# Patient Record
Sex: MALE | URBAN - METROPOLITAN AREA
[De-identification: names, ages, dates, MRNs, and addresses within clinical notes are randomized per-mention and may not be internally consistent; named-entity substitution may affect disease eponyms.]

---

## 2021-03-15 DIAGNOSIS — Z23 NEED FOR VACCINATION: ICD-10-CM

## 2022-08-10 ENCOUNTER — APPOINTMENT (RX ONLY)
Dept: URBAN - METROPOLITAN AREA CLINIC 4 | Facility: CLINIC | Age: 62
Setting detail: DERMATOLOGY
End: 2022-08-10

## 2022-08-10 DIAGNOSIS — Z71.89 OTHER SPECIFIED COUNSELING: ICD-10-CM

## 2022-08-10 DIAGNOSIS — L57.0 ACTINIC KERATOSIS: ICD-10-CM

## 2022-08-10 PROBLEM — D48.5 NEOPLASM OF UNCERTAIN BEHAVIOR OF SKIN: Status: ACTIVE | Noted: 2022-08-10

## 2022-08-10 PROCEDURE — ? COUNSELING

## 2022-08-10 PROCEDURE — 99203 OFFICE O/P NEW LOW 30 MIN: CPT

## 2022-08-10 PROCEDURE — ? ADDITIONAL NOTES

## 2022-08-10 PROCEDURE — ? PHOTO-DOCUMENTATION

## 2022-08-10 ASSESSMENT — LOCATION ZONE DERM
LOCATION ZONE: FACE
LOCATION ZONE: HAND

## 2022-08-10 ASSESSMENT — LOCATION DETAILED DESCRIPTION DERM
LOCATION DETAILED: RIGHT INFERIOR FOREHEAD
LOCATION DETAILED: LEFT RADIAL DORSAL HAND
LOCATION DETAILED: RIGHT ULNAR DORSAL HAND

## 2022-08-10 ASSESSMENT — LOCATION SIMPLE DESCRIPTION DERM
LOCATION SIMPLE: RIGHT FOREHEAD
LOCATION SIMPLE: RIGHT HAND
LOCATION SIMPLE: LEFT HAND

## 2022-08-10 NOTE — PROCEDURE: ADDITIONAL NOTES
Detail Level: Simple
Additional Notes: Discussed evaluation/tx options including monitoring, surgical removal, biopsy, and US. We discussed that due to the long standing nature with lack of progression and clinical appearance mitigate against malignancy. Patient wished to observe for now and we will re-evaluate the area in 3 months for progression. He will contact me sooner if progression prior.
Render Risk Assessment In Note?: no
Additional Notes: Trial of sun protection though if no resolve at f/u then treatment either with cryo or Efudex.

## 2023-01-09 ENCOUNTER — APPOINTMENT (RX ONLY)
Dept: URBAN - METROPOLITAN AREA CLINIC 4 | Facility: CLINIC | Age: 63
Setting detail: DERMATOLOGY
End: 2023-01-09

## 2023-01-09 DIAGNOSIS — L57.0 ACTINIC KERATOSIS: ICD-10-CM

## 2023-01-09 DIAGNOSIS — Z71.89 OTHER SPECIFIED COUNSELING: ICD-10-CM

## 2023-01-09 PROBLEM — D48.5 NEOPLASM OF UNCERTAIN BEHAVIOR OF SKIN: Status: ACTIVE | Noted: 2023-01-09

## 2023-01-09 PROCEDURE — ? ADDITIONAL NOTES

## 2023-01-09 PROCEDURE — 17000 DESTRUCT PREMALG LESION: CPT

## 2023-01-09 PROCEDURE — ? COUNSELING

## 2023-01-09 PROCEDURE — ? LIQUID NITROGEN

## 2023-01-09 PROCEDURE — 17003 DESTRUCT PREMALG LES 2-14: CPT

## 2023-01-09 PROCEDURE — 99212 OFFICE O/P EST SF 10 MIN: CPT | Mod: 25

## 2023-01-09 ASSESSMENT — LOCATION DETAILED DESCRIPTION DERM
LOCATION DETAILED: RIGHT ULNAR DORSAL HAND
LOCATION DETAILED: LEFT SUPERIOR FOREHEAD
LOCATION DETAILED: RIGHT CENTRAL ZYGOMA
LOCATION DETAILED: LEFT RADIAL DORSAL HAND
LOCATION DETAILED: RIGHT CENTRAL MALAR CHEEK
LOCATION DETAILED: LEFT SUPERIOR LATERAL MALAR CHEEK
LOCATION DETAILED: RIGHT INFERIOR FOREHEAD

## 2023-01-09 ASSESSMENT — LOCATION SIMPLE DESCRIPTION DERM
LOCATION SIMPLE: RIGHT ZYGOMA
LOCATION SIMPLE: LEFT HAND
LOCATION SIMPLE: RIGHT FOREHEAD
LOCATION SIMPLE: LEFT CHEEK
LOCATION SIMPLE: LEFT FOREHEAD
LOCATION SIMPLE: RIGHT CHEEK
LOCATION SIMPLE: RIGHT HAND

## 2023-01-09 ASSESSMENT — LOCATION ZONE DERM
LOCATION ZONE: HAND
LOCATION ZONE: FACE

## 2023-01-09 NOTE — PROCEDURE: ADDITIONAL NOTES
Additional Notes: We have discussed that I am uncertain of the nature on the ear and we have discussed the potential for malignancy though the long standing, unchanged, nature mitigates against this. Offered bx vs US vs complete removal and he wishes to observe.
Detail Level: Simple
Render Risk Assessment In Note?: no

## 2023-01-09 NOTE — PROCEDURE: LIQUID NITROGEN
Consent: The patient's consent was obtained including but not limited to risks of crusting, scabbing, blistering, scarring, darker or lighter pigmentary change, recurrence, incomplete removal and infection.
Application Tool (Optional): Cry-AC
Render Note In Bullet Format When Appropriate: No
Number Of Freeze-Thaw Cycles: 1 freeze-thaw cycle
Duration Of Freeze Thaw-Cycle (Seconds): 3
Post-Care Instructions: I reviewed with the patient in detail post-care instructions. Patient is to wear sunprotection, and avoid picking at any of the treated lesions. Pt may apply Vaseline to crusted or scabbing areas.
Show Applicator Variable?: Yes
Detail Level: Detailed
Aperture Size (Optional): C

## 2023-08-23 ENCOUNTER — HOSPITAL ENCOUNTER (OUTPATIENT)
Dept: RADIOLOGY | Facility: MEDICAL CENTER | Age: 63
End: 2023-08-23
Payer: COMMERCIAL

## 2023-09-08 ENCOUNTER — APPOINTMENT (OUTPATIENT)
Dept: ADMISSIONS | Facility: MEDICAL CENTER | Age: 63
DRG: 328 | End: 2023-09-08
Attending: SURGERY
Payer: COMMERCIAL

## 2023-09-15 ENCOUNTER — PRE-ADMISSION TESTING (OUTPATIENT)
Dept: ADMISSIONS | Facility: MEDICAL CENTER | Age: 63
DRG: 328 | End: 2023-09-15
Attending: SURGERY
Payer: COMMERCIAL

## 2023-09-15 RX ORDER — ATORVASTATIN CALCIUM 40 MG/1
40 TABLET, FILM COATED ORAL DAILY
COMMUNITY
Start: 2023-09-09

## 2023-09-15 RX ORDER — LORAZEPAM 1 MG/1
1 TABLET ORAL 2 TIMES DAILY PRN
COMMUNITY
Start: 2023-08-04

## 2023-09-15 RX ORDER — PANTOPRAZOLE SODIUM 40 MG/1
40 TABLET, DELAYED RELEASE ORAL 2 TIMES DAILY
Status: ON HOLD | COMMUNITY
Start: 2023-07-20 | End: 2023-10-13

## 2023-09-15 NOTE — OR NURSING
RN pre admit tele appointment complete.  Surgical clearance strongly recommended per [facility] Pre-Procedural Clearance Protocol.  Clearance forms faxed to Dr. Ganser 9/15/23 at 07:51.  Fax confirmation received.

## 2023-09-19 ENCOUNTER — PRE-ADMISSION TESTING (OUTPATIENT)
Dept: ADMISSIONS | Facility: MEDICAL CENTER | Age: 63
DRG: 328 | End: 2023-09-19
Attending: SURGERY
Payer: COMMERCIAL

## 2023-09-19 DIAGNOSIS — Z01.810 PRE-OPERATIVE CARDIOVASCULAR EXAMINATION: ICD-10-CM

## 2023-09-19 DIAGNOSIS — Z01.812 PRE-OPERATIVE LABORATORY EXAMINATION: ICD-10-CM

## 2023-09-19 LAB
ABO GROUP BLD: NORMAL
ANION GAP SERPL CALC-SCNC: 10 MMOL/L (ref 7–16)
BLD GP AB SCN SERPL QL: NORMAL
BUN SERPL-MCNC: 13 MG/DL (ref 8–22)
CALCIUM SERPL-MCNC: 10.8 MG/DL (ref 8.5–10.5)
CHLORIDE SERPL-SCNC: 107 MMOL/L (ref 96–112)
CO2 SERPL-SCNC: 26 MMOL/L (ref 20–33)
CREAT SERPL-MCNC: 0.93 MG/DL (ref 0.5–1.4)
EKG IMPRESSION: NORMAL
ERYTHROCYTE [DISTWIDTH] IN BLOOD BY AUTOMATED COUNT: 48.4 FL (ref 35.9–50)
GFR SERPLBLD CREATININE-BSD FMLA CKD-EPI: 92 ML/MIN/1.73 M 2
GLUCOSE SERPL-MCNC: 105 MG/DL (ref 65–99)
HCT VFR BLD AUTO: 47.5 % (ref 42–52)
HGB BLD-MCNC: 16 G/DL (ref 14–18)
MCH RBC QN AUTO: 30.8 PG (ref 27–33)
MCHC RBC AUTO-ENTMCNC: 33.7 G/DL (ref 32.3–36.5)
MCV RBC AUTO: 91.5 FL (ref 81.4–97.8)
PLATELET # BLD AUTO: 242 K/UL (ref 164–446)
PMV BLD AUTO: 9.8 FL (ref 9–12.9)
POTASSIUM SERPL-SCNC: 4.4 MMOL/L (ref 3.6–5.5)
RBC # BLD AUTO: 5.19 M/UL (ref 4.7–6.1)
RH BLD: NORMAL
SODIUM SERPL-SCNC: 143 MMOL/L (ref 135–145)
WBC # BLD AUTO: 9 K/UL (ref 4.8–10.8)

## 2023-09-19 PROCEDURE — 85027 COMPLETE CBC AUTOMATED: CPT

## 2023-09-19 PROCEDURE — 86901 BLOOD TYPING SEROLOGIC RH(D): CPT

## 2023-09-19 PROCEDURE — 86900 BLOOD TYPING SEROLOGIC ABO: CPT

## 2023-09-19 PROCEDURE — 86850 RBC ANTIBODY SCREEN: CPT

## 2023-09-19 PROCEDURE — 93005 ELECTROCARDIOGRAM TRACING: CPT

## 2023-09-19 PROCEDURE — 80048 BASIC METABOLIC PNL TOTAL CA: CPT

## 2023-09-19 PROCEDURE — 36415 COLL VENOUS BLD VENIPUNCTURE: CPT

## 2023-09-19 PROCEDURE — 93010 ELECTROCARDIOGRAM REPORT: CPT | Performed by: INTERNAL MEDICINE

## 2023-10-09 ENCOUNTER — APPOINTMENT (OUTPATIENT)
Dept: RADIOLOGY | Facility: MEDICAL CENTER | Age: 63
DRG: 328 | End: 2023-10-09
Attending: PHYSICIAN ASSISTANT
Payer: COMMERCIAL

## 2023-10-09 ENCOUNTER — HOSPITAL ENCOUNTER (INPATIENT)
Facility: MEDICAL CENTER | Age: 63
LOS: 4 days | DRG: 328 | End: 2023-10-13
Attending: SURGERY | Admitting: SURGERY
Payer: COMMERCIAL

## 2023-10-09 ENCOUNTER — ANESTHESIA EVENT (OUTPATIENT)
Dept: SURGERY | Facility: MEDICAL CENTER | Age: 63
DRG: 328 | End: 2023-10-09
Payer: COMMERCIAL

## 2023-10-09 ENCOUNTER — ANESTHESIA (OUTPATIENT)
Dept: SURGERY | Facility: MEDICAL CENTER | Age: 63
DRG: 328 | End: 2023-10-09
Payer: COMMERCIAL

## 2023-10-09 DIAGNOSIS — G89.18 POSTOPERATIVE PAIN: ICD-10-CM

## 2023-10-09 DIAGNOSIS — I10 HYPERTENSION, UNSPECIFIED TYPE: ICD-10-CM

## 2023-10-09 LAB
ABO + RH BLD: NORMAL
INR PPP: 1.02 (ref 0.87–1.13)
PATHOLOGY CONSULT NOTE: NORMAL
PROTHROMBIN TIME: 13.5 SEC (ref 12–14.6)

## 2023-10-09 PROCEDURE — 700105 HCHG RX REV CODE 258: Performed by: PHYSICIAN ASSISTANT

## 2023-10-09 PROCEDURE — 160048 HCHG OR STATISTICAL LEVEL 1-5: Performed by: SURGERY

## 2023-10-09 PROCEDURE — 700101 HCHG RX REV CODE 250: Performed by: ANESTHESIOLOGY

## 2023-10-09 PROCEDURE — 71045 X-RAY EXAM CHEST 1 VIEW: CPT

## 2023-10-09 PROCEDURE — 88309 TISSUE EXAM BY PATHOLOGIST: CPT

## 2023-10-09 PROCEDURE — 700111 HCHG RX REV CODE 636 W/ 250 OVERRIDE (IP): Performed by: ANESTHESIOLOGY

## 2023-10-09 PROCEDURE — 07BD4ZX EXCISION OF AORTIC LYMPHATIC, PERCUTANEOUS ENDOSCOPIC APPROACH, DIAGNOSTIC: ICD-10-PCS | Performed by: SURGERY

## 2023-10-09 PROCEDURE — 160029 HCHG SURGERY MINUTES - 1ST 30 MINS LEVEL 4: Performed by: SURGERY

## 2023-10-09 PROCEDURE — 700101 HCHG RX REV CODE 250: Performed by: SURGERY

## 2023-10-09 PROCEDURE — 36415 COLL VENOUS BLD VENIPUNCTURE: CPT

## 2023-10-09 PROCEDURE — 160041 HCHG SURGERY MINUTES - EA ADDL 1 MIN LEVEL 4: Performed by: SURGERY

## 2023-10-09 PROCEDURE — 700104 HCHG RX REV CODE 254: Performed by: ANESTHESIOLOGY

## 2023-10-09 PROCEDURE — 110371 HCHG SHELL REV 272: Performed by: SURGERY

## 2023-10-09 PROCEDURE — 160009 HCHG ANES TIME/MIN: Performed by: SURGERY

## 2023-10-09 PROCEDURE — 700111 HCHG RX REV CODE 636 W/ 250 OVERRIDE (IP): Performed by: PHYSICIAN ASSISTANT

## 2023-10-09 PROCEDURE — 07B74ZX EXCISION OF THORAX LYMPHATIC, PERCUTANEOUS ENDOSCOPIC APPROACH, DIAGNOSTIC: ICD-10-PCS | Performed by: SURGERY

## 2023-10-09 PROCEDURE — 700101 HCHG RX REV CODE 250

## 2023-10-09 PROCEDURE — 770001 HCHG ROOM/CARE - MED/SURG/GYN PRIV*

## 2023-10-09 PROCEDURE — 700105 HCHG RX REV CODE 258: Performed by: SURGERY

## 2023-10-09 PROCEDURE — 0DB54ZZ EXCISION OF ESOPHAGUS, PERCUTANEOUS ENDOSCOPIC APPROACH: ICD-10-PCS | Performed by: SURGERY

## 2023-10-09 PROCEDURE — 0DX64Z5 TRANSFER STOMACH TO ESOPHAGUS, PERCUTANEOUS ENDOSCOPIC APPROACH: ICD-10-PCS | Performed by: SURGERY

## 2023-10-09 PROCEDURE — 85610 PROTHROMBIN TIME: CPT

## 2023-10-09 PROCEDURE — 160036 HCHG PACU - EA ADDL 30 MINS PHASE I: Performed by: SURGERY

## 2023-10-09 PROCEDURE — 0DHA4UZ INSERTION OF FEEDING DEVICE INTO JEJUNUM, PERCUTANEOUS ENDOSCOPIC APPROACH: ICD-10-PCS | Performed by: SURGERY

## 2023-10-09 PROCEDURE — 160002 HCHG RECOVERY MINUTES (STAT): Performed by: SURGERY

## 2023-10-09 PROCEDURE — 160035 HCHG PACU - 1ST 60 MINS PHASE I: Performed by: SURGERY

## 2023-10-09 PROCEDURE — 88307 TISSUE EXAM BY PATHOLOGIST: CPT | Mod: 59

## 2023-10-09 DEVICE — TUBE JEJUNOSTOMY MIC-KEY 14FR - (1/EA): Type: IMPLANTABLE DEVICE | Site: ABDOMEN | Status: FUNCTIONAL

## 2023-10-09 RX ORDER — HYDROMORPHONE HYDROCHLORIDE 1 MG/ML
0.1 INJECTION, SOLUTION INTRAMUSCULAR; INTRAVENOUS; SUBCUTANEOUS
Status: DISCONTINUED | OUTPATIENT
Start: 2023-10-09 | End: 2023-10-09 | Stop reason: HOSPADM

## 2023-10-09 RX ORDER — HYDRALAZINE HYDROCHLORIDE 20 MG/ML
10 INJECTION INTRAMUSCULAR; INTRAVENOUS EVERY 6 HOURS PRN
Status: DISCONTINUED | OUTPATIENT
Start: 2023-10-09 | End: 2023-10-13 | Stop reason: HOSPADM

## 2023-10-09 RX ORDER — INDOCYANINE GREEN AND WATER 25 MG
KIT INJECTION PRN
Status: DISCONTINUED | OUTPATIENT
Start: 2023-10-09 | End: 2023-10-09 | Stop reason: SURG

## 2023-10-09 RX ORDER — EPHEDRINE SULFATE 50 MG/ML
5 INJECTION, SOLUTION INTRAVENOUS
Status: DISCONTINUED | OUTPATIENT
Start: 2023-10-09 | End: 2023-10-09 | Stop reason: HOSPADM

## 2023-10-09 RX ORDER — EPHEDRINE SULFATE 50 MG/ML
INJECTION, SOLUTION INTRAVENOUS PRN
Status: DISCONTINUED | OUTPATIENT
Start: 2023-10-09 | End: 2023-10-09 | Stop reason: SURG

## 2023-10-09 RX ORDER — ROCURONIUM BROMIDE 10 MG/ML
INJECTION, SOLUTION INTRAVENOUS PRN
Status: DISCONTINUED | OUTPATIENT
Start: 2023-10-09 | End: 2023-10-09 | Stop reason: SURG

## 2023-10-09 RX ORDER — HALOPERIDOL 5 MG/ML
1 INJECTION INTRAMUSCULAR
Status: DISCONTINUED | OUTPATIENT
Start: 2023-10-09 | End: 2023-10-09 | Stop reason: HOSPADM

## 2023-10-09 RX ORDER — CEFOTETAN DISODIUM 2 G/20ML
INJECTION, POWDER, FOR SOLUTION INTRAMUSCULAR; INTRAVENOUS PRN
Status: DISCONTINUED | OUTPATIENT
Start: 2023-10-09 | End: 2023-10-09 | Stop reason: SURG

## 2023-10-09 RX ORDER — METOPROLOL SUCCINATE 50 MG/1
50 TABLET, EXTENDED RELEASE ORAL EVERY MORNING
Status: ON HOLD | COMMUNITY
End: 2023-10-13

## 2023-10-09 RX ORDER — HYDROMORPHONE HYDROCHLORIDE 1 MG/ML
0.4 INJECTION, SOLUTION INTRAMUSCULAR; INTRAVENOUS; SUBCUTANEOUS
Status: DISCONTINUED | OUTPATIENT
Start: 2023-10-09 | End: 2023-10-09 | Stop reason: HOSPADM

## 2023-10-09 RX ORDER — SODIUM CHLORIDE, SODIUM LACTATE, POTASSIUM CHLORIDE, CALCIUM CHLORIDE 600; 310; 30; 20 MG/100ML; MG/100ML; MG/100ML; MG/100ML
INJECTION, SOLUTION INTRAVENOUS CONTINUOUS
Status: DISCONTINUED | OUTPATIENT
Start: 2023-10-09 | End: 2023-10-09 | Stop reason: HOSPADM

## 2023-10-09 RX ORDER — OXYCODONE HCL 5 MG/5 ML
10 SOLUTION, ORAL ORAL
Status: DISCONTINUED | OUTPATIENT
Start: 2023-10-09 | End: 2023-10-09 | Stop reason: HOSPADM

## 2023-10-09 RX ORDER — HYDROMORPHONE HYDROCHLORIDE 1 MG/ML
0.2 INJECTION, SOLUTION INTRAMUSCULAR; INTRAVENOUS; SUBCUTANEOUS
Status: DISCONTINUED | OUTPATIENT
Start: 2023-10-09 | End: 2023-10-09 | Stop reason: HOSPADM

## 2023-10-09 RX ORDER — ENALAPRILAT 1.25 MG/ML
2.5 INJECTION INTRAVENOUS EVERY 6 HOURS PRN
Status: DISCONTINUED | OUTPATIENT
Start: 2023-10-09 | End: 2023-10-13 | Stop reason: HOSPADM

## 2023-10-09 RX ORDER — SODIUM CHLORIDE, SODIUM LACTATE, POTASSIUM CHLORIDE, CALCIUM CHLORIDE 600; 310; 30; 20 MG/100ML; MG/100ML; MG/100ML; MG/100ML
INJECTION, SOLUTION INTRAVENOUS CONTINUOUS
Status: DISCONTINUED | OUTPATIENT
Start: 2023-10-09 | End: 2023-10-09

## 2023-10-09 RX ORDER — KETOROLAC TROMETHAMINE 30 MG/ML
30 INJECTION, SOLUTION INTRAMUSCULAR; INTRAVENOUS EVERY 6 HOURS PRN
Status: DISPENSED | OUTPATIENT
Start: 2023-10-09 | End: 2023-10-12

## 2023-10-09 RX ORDER — ONDANSETRON 2 MG/ML
INJECTION INTRAMUSCULAR; INTRAVENOUS PRN
Status: DISCONTINUED | OUTPATIENT
Start: 2023-10-09 | End: 2023-10-09 | Stop reason: SURG

## 2023-10-09 RX ORDER — BUPIVACAINE HYDROCHLORIDE AND EPINEPHRINE 5; 5 MG/ML; UG/ML
INJECTION, SOLUTION EPIDURAL; INTRACAUDAL; PERINEURAL
Status: DISCONTINUED | OUTPATIENT
Start: 2023-10-09 | End: 2023-10-09 | Stop reason: HOSPADM

## 2023-10-09 RX ORDER — NICOTINE 21 MG/24HR
1 PATCH, TRANSDERMAL 24 HOURS TRANSDERMAL EVERY 24 HOURS
Status: ON HOLD | COMMUNITY
End: 2023-10-13

## 2023-10-09 RX ORDER — METOPROLOL TARTRATE 1 MG/ML
INJECTION, SOLUTION INTRAVENOUS PRN
Status: DISCONTINUED | OUTPATIENT
Start: 2023-10-09 | End: 2023-10-09 | Stop reason: SURG

## 2023-10-09 RX ORDER — MIDAZOLAM HYDROCHLORIDE 1 MG/ML
INJECTION INTRAMUSCULAR; INTRAVENOUS PRN
Status: DISCONTINUED | OUTPATIENT
Start: 2023-10-09 | End: 2023-10-09 | Stop reason: SURG

## 2023-10-09 RX ORDER — PHENYLEPHRINE HCL IN 0.9% NACL 0.5 MG/5ML
SYRINGE (ML) INTRAVENOUS PRN
Status: DISCONTINUED | OUTPATIENT
Start: 2023-10-09 | End: 2023-10-09 | Stop reason: SURG

## 2023-10-09 RX ORDER — DIPHENHYDRAMINE HYDROCHLORIDE 50 MG/ML
12.5 INJECTION INTRAMUSCULAR; INTRAVENOUS
Status: DISCONTINUED | OUTPATIENT
Start: 2023-10-09 | End: 2023-10-09 | Stop reason: HOSPADM

## 2023-10-09 RX ORDER — ONDANSETRON 2 MG/ML
4 INJECTION INTRAMUSCULAR; INTRAVENOUS
Status: DISCONTINUED | OUTPATIENT
Start: 2023-10-09 | End: 2023-10-09 | Stop reason: HOSPADM

## 2023-10-09 RX ORDER — DEXAMETHASONE SODIUM PHOSPHATE 4 MG/ML
INJECTION, SOLUTION INTRA-ARTICULAR; INTRALESIONAL; INTRAMUSCULAR; INTRAVENOUS; SOFT TISSUE PRN
Status: DISCONTINUED | OUTPATIENT
Start: 2023-10-09 | End: 2023-10-09 | Stop reason: SURG

## 2023-10-09 RX ORDER — DIPHENHYDRAMINE HYDROCHLORIDE 50 MG/ML
25 INJECTION INTRAMUSCULAR; INTRAVENOUS EVERY 6 HOURS PRN
Status: DISCONTINUED | OUTPATIENT
Start: 2023-10-09 | End: 2023-10-13 | Stop reason: HOSPADM

## 2023-10-09 RX ORDER — SODIUM CHLORIDE, SODIUM LACTATE, POTASSIUM CHLORIDE, CALCIUM CHLORIDE 600; 310; 30; 20 MG/100ML; MG/100ML; MG/100ML; MG/100ML
INJECTION, SOLUTION INTRAVENOUS CONTINUOUS
Status: DISCONTINUED | OUTPATIENT
Start: 2023-10-09 | End: 2023-10-13 | Stop reason: HOSPADM

## 2023-10-09 RX ORDER — OXYCODONE HCL 5 MG/5 ML
5 SOLUTION, ORAL ORAL
Status: DISCONTINUED | OUTPATIENT
Start: 2023-10-09 | End: 2023-10-09 | Stop reason: HOSPADM

## 2023-10-09 RX ORDER — ONDANSETRON 2 MG/ML
4 INJECTION INTRAMUSCULAR; INTRAVENOUS EVERY 4 HOURS PRN
Status: DISCONTINUED | OUTPATIENT
Start: 2023-10-09 | End: 2023-10-09

## 2023-10-09 RX ORDER — LABETALOL HYDROCHLORIDE 5 MG/ML
10 INJECTION, SOLUTION INTRAVENOUS EVERY 6 HOURS PRN
Status: DISCONTINUED | OUTPATIENT
Start: 2023-10-09 | End: 2023-10-13 | Stop reason: HOSPADM

## 2023-10-09 RX ORDER — LIDOCAINE HYDROCHLORIDE 20 MG/ML
INJECTION, SOLUTION EPIDURAL; INFILTRATION; INTRACAUDAL; PERINEURAL PRN
Status: DISCONTINUED | OUTPATIENT
Start: 2023-10-09 | End: 2023-10-09 | Stop reason: SURG

## 2023-10-09 RX ORDER — MEPERIDINE HYDROCHLORIDE 25 MG/ML
6.25 INJECTION INTRAMUSCULAR; INTRAVENOUS; SUBCUTANEOUS
Status: DISCONTINUED | OUTPATIENT
Start: 2023-10-09 | End: 2023-10-09 | Stop reason: HOSPADM

## 2023-10-09 RX ORDER — ONDANSETRON 4 MG/1
4 TABLET, ORALLY DISINTEGRATING ORAL EVERY 4 HOURS PRN
Status: DISCONTINUED | OUTPATIENT
Start: 2023-10-09 | End: 2023-10-10

## 2023-10-09 RX ORDER — MORPHINE SULFATE 4 MG/ML
2 INJECTION INTRAVENOUS
Status: DISCONTINUED | OUTPATIENT
Start: 2023-10-09 | End: 2023-10-13 | Stop reason: HOSPADM

## 2023-10-09 RX ORDER — ENOXAPARIN SODIUM 100 MG/ML
40 INJECTION SUBCUTANEOUS DAILY
Status: DISCONTINUED | OUTPATIENT
Start: 2023-10-10 | End: 2023-10-13 | Stop reason: HOSPADM

## 2023-10-09 RX ORDER — LABETALOL HYDROCHLORIDE 5 MG/ML
5 INJECTION, SOLUTION INTRAVENOUS
Status: DISCONTINUED | OUTPATIENT
Start: 2023-10-09 | End: 2023-10-09 | Stop reason: HOSPADM

## 2023-10-09 RX ORDER — HYDRALAZINE HYDROCHLORIDE 20 MG/ML
5 INJECTION INTRAMUSCULAR; INTRAVENOUS
Status: DISCONTINUED | OUTPATIENT
Start: 2023-10-09 | End: 2023-10-09 | Stop reason: HOSPADM

## 2023-10-09 RX ADMIN — Medication 200 MCG: at 12:36

## 2023-10-09 RX ADMIN — ROCURONIUM BROMIDE 50 MG: 50 INJECTION, SOLUTION INTRAVENOUS at 14:20

## 2023-10-09 RX ADMIN — METOPROLOL TARTRATE 5 MG: 5 INJECTION INTRAVENOUS at 13:23

## 2023-10-09 RX ADMIN — SODIUM CHLORIDE, POTASSIUM CHLORIDE, SODIUM LACTATE AND CALCIUM CHLORIDE: 600; 310; 30; 20 INJECTION, SOLUTION INTRAVENOUS at 10:44

## 2023-10-09 RX ADMIN — SODIUM CHLORIDE, POTASSIUM CHLORIDE, SODIUM LACTATE AND CALCIUM CHLORIDE: 600; 310; 30; 20 INJECTION, SOLUTION INTRAVENOUS at 19:50

## 2023-10-09 RX ADMIN — KETOROLAC TROMETHAMINE 30 MG: 30 INJECTION, SOLUTION INTRAMUSCULAR; INTRAVENOUS at 22:42

## 2023-10-09 RX ADMIN — FENTANYL CITRATE 50 MCG: 50 INJECTION, SOLUTION INTRAMUSCULAR; INTRAVENOUS at 15:13

## 2023-10-09 RX ADMIN — Medication 200 MCG: at 13:06

## 2023-10-09 RX ADMIN — Medication 100 MCG: at 14:19

## 2023-10-09 RX ADMIN — CEFOTETAN DISODIUM 2 G: 2 INJECTION, POWDER, FOR SOLUTION INTRAMUSCULAR; INTRAVENOUS at 12:22

## 2023-10-09 RX ADMIN — DEXAMETHASONE SODIUM PHOSPHATE 8 MG: 4 INJECTION INTRA-ARTICULAR; INTRALESIONAL; INTRAMUSCULAR; INTRAVENOUS; SOFT TISSUE at 12:22

## 2023-10-09 RX ADMIN — INDOCYANINE GREEN AND WATER 7.5 MG: KIT at 13:18

## 2023-10-09 RX ADMIN — MIDAZOLAM 2 MG: 1 INJECTION, SOLUTION INTRAMUSCULAR; INTRAVENOUS at 12:07

## 2023-10-09 RX ADMIN — FENTANYL CITRATE 50 MCG: 50 INJECTION, SOLUTION INTRAMUSCULAR; INTRAVENOUS at 12:12

## 2023-10-09 RX ADMIN — LIDOCAINE HYDROCHLORIDE 50 MG: 20 INJECTION, SOLUTION EPIDURAL; INFILTRATION; INTRACAUDAL at 12:12

## 2023-10-09 RX ADMIN — Medication 200 MCG: at 12:33

## 2023-10-09 RX ADMIN — ONDANSETRON 4 MG: 2 INJECTION INTRAMUSCULAR; INTRAVENOUS at 15:03

## 2023-10-09 RX ADMIN — HYDROMORPHONE HYDROCHLORIDE 0.4 MG: 1 INJECTION, SOLUTION INTRAMUSCULAR; INTRAVENOUS; SUBCUTANEOUS at 17:34

## 2023-10-09 RX ADMIN — CEFAZOLIN 2 G: 2 INJECTION, POWDER, FOR SOLUTION INTRAMUSCULAR; INTRAVENOUS at 19:53

## 2023-10-09 RX ADMIN — MORPHINE SULFATE 2 MG: 4 INJECTION, SOLUTION INTRAMUSCULAR; INTRAVENOUS at 19:45

## 2023-10-09 RX ADMIN — FENTANYL CITRATE 50 MCG: 50 INJECTION, SOLUTION INTRAMUSCULAR; INTRAVENOUS at 17:07

## 2023-10-09 RX ADMIN — ROCURONIUM BROMIDE 100 MG: 50 INJECTION, SOLUTION INTRAVENOUS at 12:12

## 2023-10-09 RX ADMIN — PROPOFOL 200 MG: 10 INJECTION, EMULSION INTRAVENOUS at 12:12

## 2023-10-09 RX ADMIN — EPHEDRINE SULFATE 10 MG: 50 INJECTION INTRAVENOUS at 12:37

## 2023-10-09 RX ADMIN — SUGAMMADEX 200 MG: 100 INJECTION, SOLUTION INTRAVENOUS at 15:03

## 2023-10-09 RX ADMIN — HYDROMORPHONE HYDROCHLORIDE 0.4 MG: 1 INJECTION, SOLUTION INTRAMUSCULAR; INTRAVENOUS; SUBCUTANEOUS at 17:58

## 2023-10-09 RX ADMIN — HYDROMORPHONE HYDROCHLORIDE 0.4 MG: 1 INJECTION, SOLUTION INTRAMUSCULAR; INTRAVENOUS; SUBCUTANEOUS at 17:22

## 2023-10-09 RX ADMIN — FENTANYL CITRATE 50 MCG: 50 INJECTION, SOLUTION INTRAMUSCULAR; INTRAVENOUS at 15:08

## 2023-10-09 RX ADMIN — METHOCARBAMOL 1000 MG: 100 INJECTION, SOLUTION INTRAMUSCULAR; INTRAVENOUS at 15:56

## 2023-10-09 RX ADMIN — FENTANYL CITRATE 25 MCG: 50 INJECTION, SOLUTION INTRAMUSCULAR; INTRAVENOUS at 16:55

## 2023-10-09 RX ADMIN — ROCURONIUM BROMIDE 50 MG: 50 INJECTION, SOLUTION INTRAVENOUS at 13:20

## 2023-10-09 RX ADMIN — FENTANYL CITRATE 25 MCG: 50 INJECTION, SOLUTION INTRAMUSCULAR; INTRAVENOUS at 17:00

## 2023-10-09 RX ADMIN — INDOCYANINE GREEN AND WATER 7.5 MG: KIT at 14:38

## 2023-10-09 RX ADMIN — FENTANYL CITRATE 50 MCG: 50 INJECTION, SOLUTION INTRAMUSCULAR; INTRAVENOUS at 12:46

## 2023-10-09 RX ADMIN — FENTANYL CITRATE 50 MCG: 50 INJECTION, SOLUTION INTRAMUSCULAR; INTRAVENOUS at 15:03

## 2023-10-09 RX ADMIN — HYDROMORPHONE HYDROCHLORIDE 0.2 MG: 1 INJECTION, SOLUTION INTRAMUSCULAR; INTRAVENOUS; SUBCUTANEOUS at 17:48

## 2023-10-09 ASSESSMENT — PAIN DESCRIPTION - PAIN TYPE
TYPE: SURGICAL PAIN
TYPE: SURGICAL PAIN
TYPE: ACUTE PAIN;SURGICAL PAIN
TYPE: SURGICAL PAIN
TYPE: ACUTE PAIN
TYPE: SURGICAL PAIN
TYPE: ACUTE PAIN
TYPE: SURGICAL PAIN

## 2023-10-09 NOTE — OP REPORT
OPERATIVE REPORT    DATE OF SERVICE: 10/9/2023      PREOPERATIVE DIAGNOSIS:  Distal esophageal cancer  chemotherapy and radiation.     POSTOPERATIVE DIAGNOSIS:  Distal esophageal cancer  chemotherapy and radiation.     PROCEDURE:  Minimally invasive esophagectomy with gastric pull-up, jejunostomy placement     SURGEON:  John H. Ganser, MD.     ASSISTANT:  Hieu Macias PA-C.     ANESTHESIA:  General.     ANESTHESIOLOGIST:  Tommie Murphy MD.     INDICATIONS:  Patient was diagnosed several months ago with    esophageal cancer and has undergone endoscopic mucosal resection with positive margin. There was no uptake on PEt so it was decided to proceed with esophagectomy.  Risks, benefits, and alternatives to minimally invasive esophagectomy with gastric pull-up and jejunostomy tube placement were outlined in detail.  All questions answered and they wish to proceed.     FINDINGS:  There was no significant adenopathy or evidence of extension of    tumor beyond the esophagus. The procedure was able to be carried out with minimally invasive approach..     DESCRIPTION OF PROCEDURE:  The patient was identified and general anesthetic    administered with a double-lumen endotracheal tube.  His abdomen was prepped    and draped in the usual sterile fashion.  Local anesthesia of 0.5% Marcaine    with epinephrine was injected prior to skin incision.  A small incision was    made to left midline in low epigastric region and the Veress needle passed.     The abdomen was insufflated with carbon-dioxide without incident and a 5-mm    blunt trocar and 5-mm 30-degree scope inserted.  Hector liver retractor was   passed through a small subxiphoid incision, used to elevate the lateral    segment of liver and this was held with the robotic arm.  Right upper quadrant   12 mm, left upper quadrant 12 mm, left lateral subcostal 5 mm trocars were    placed. Inspection showed no evidence of peritoneal nodularity, significant adenopathy or  superficial liver lesions.       Dissection was begun by dividing the gastrohepatic ligament using the Harmonic   scalpel.  The hiatus was mobilized circumferentially using the Harmonic    scalpel, which was used for all of the dissection.  The omentum was then    divided, being careful to avoid injury to the gastroepiploic vessels.  This    was carried from the distal aspect of the stomach  the colon away    all across the transverse colon.  Short gastric vessels were then taken    down leaving a tongue of omentum attached to the greater curve of the    stomach to be wrapped around the anastomosis later.  The left gastric vessels    were then identified. There was no significant suspicious appearing    adenopathy. Fatty tissue and nodes around the celiac were dissected out and sent as a specimen. The left gastric vessels were then divided with the Jolley 60    powered stapler using a white load with good control.  Along the lesser curve    of the stomach distally the lesser curve vessels were divided with the    Harmonic device.  Starting at this point along the distal lesser curve, the    distal stomach and greater curve were tubularized with sequential firing of    the Jolley 60 stapler using gold loads going all the way up to the fundus    providing adequate length.  The distal stomach appeared well vascularized using ICG immunofluorenscence.     I was then able to dissect circumferentially around the esophagus well into    the mediastinum.  The periesophageal nodes distally were dissected out and sent    as a separate specimen.  The right pleura was entered and there were no pleural adhesions.  Dissection was carried as high as  possible around the esophagus.  The proximal and distal stomach were attached with   the EndoStitch device and all this was tucked in to the right chest and the    omentum was passed in there as well.  Hemostasis was assured in the abdomen.    An ARRON jejunostomy was then  placed by grasping the bowel 30 cm below the ligament of Treitz. The LUQ incision was opened slightly and the bowel brought out. The tube was inserted and secured with a purse string suture of 3-0 silk and a Greenbrier tunnel created. It was returned to the abdomen and the tube brought out a lower LUQ incision and secured at the skin with nylon. The trocars were removed and the fascia closed with 0-Vicryl and incisions with 4-0 Vicryl.     The patient was then placed in left lateral decubitus position and his right    chest was prepped and draped in the usual sterile fashion.  Further local    anesthesia was injected and three 5 mm trocars were placed in the accessory    incision made in the 9th intercostal space posteriorly.      The esophagus was then  dissected circumferentially  up to the    azygos vein clipping all feeding vessels and dividing with the Harmonic    scalpel.  The azygos vein was divided with the vascular stapler. Circumferential    dissection was carried around the esophagus several centimeters above the    azygos vein and the esophagus was divided with the Harmonic scalpel and the    specimen removed and sent for permanent histology.  Hemostasis assured in the    chest and the irrigation carried out.  The distal stomach was brought up and    there appeared to be plenty of length. Another dose of ICG was injected confirming good blood supply in the stomach and esophagus. A pursestring of 0 Surgidac was    placed in the esophagus proximally and the 25 mm circular stapler anvil inserted and    the pursestring tied.  A gastrotomy was made in the tip of the distal stomach    and the stapler handle was passed through the ribs and passed into the tip of    the stomach and the spike advanced along the greater curve more distally.  The   spike was engaged with the anvil and maintaining orientation proximally and    Distally the stapler was closed and fired.  It was removed and there were 2    full rings of  tissue.  Nasogastric tube was passed by anesthesia and passed    into the distal stomach.  The gastrotomy was then closed with firing of the    stapler using gold load.  The stomach and again appeared viable and hemostasis   was assured, the chest was irrigated.  The omentum was then brought up from    behind and wrapped around the anastomosis.     A 19-Honduran Ye drain was passed through the midaxillary line    incision and placed against the mediastinum and secured to skin with silk.     Lung was reexpanded and the accessory incision closed at the muscle with 2-0    Vicryl and skin incisions with 4-0 Vicryl subcuticular sutures.  Sterile    dressings were applied.  The patient returned to recovery room in stable    condition.    An assistant was required in this case due to the complexity, need to run the scope, assist with retraction, exposure and closure.        ____________________________________     JOHN H. GANSER, MD

## 2023-10-09 NOTE — ANESTHESIA PREPROCEDURE EVALUATION
Case: [id number] Date/Time: 10/09/23 1145    Procedures:       ESOPHAGECTOMY WITH GASTRIC PULL UP, NODE DISSECTION, JEJUNOSTOMY PLACEMENT, LAPAROSCOPY THORACOSCOPY      CREATION, GASTROSTOMY, LAPAROSCOPIC    Pre-op diagnosis: MALIGNANT TUMOR OF ESOPHAGUS    Location: [facility] OR 08 / SURGERY [facility]    Surgeons: John H Ganser, M.D.          Relevant Problems   ANESTHESIA (within normal limits)      PULMONARY (within normal limits)      NEURO (within normal limits)      CARDIAC   (positive) CAD (coronary artery disease)   (positive) Presence of stent in LAD coronary artery   (positive) STEMI (ST elevation myocardial infarction) (HCC)      GI (within normal limits)       (within normal limits)      ENDO (within normal limits)      Other   (positive) Hypercholesterolemia   (positive) Smoking   STEMI 2015 and PCTA x2, stable since    Physical Exam    Airway   Mallampati: II  TM distance: >3 FB  Neck ROM: full       Cardiovascular - normal exam  Rhythm: regular  Rate: normal  (-) murmur     Dental - normal exam           Pulmonary - normal exam  Breath sounds clear to auscultation     Abdominal    Neurological - normal exam                 Anesthesia Plan    ASA 3   ASA physical status 3 criteria: CAD/stents (> 3 months)    Plan - general       Airway plan will be ETT    (A-line)      Induction: intravenous    Postoperative Plan: Postoperative administration of opioids is intended.    Pertinent diagnostic labs and testing reviewed    Informed Consent:    Anesthetic plan and risks discussed with patient.    Use of blood products discussed with: patient whom consented to blood products.

## 2023-10-09 NOTE — ANESTHESIA PROCEDURE NOTES
Airway    Date/Time: 10/9/2023 12:12 PM    Performed by: Rubén Murphy M.D.  Authorized by: Rubén Murphy M.D.    Location:  OR  Urgency:  Elective  Indications for Airway Management:  Anesthesia      Spontaneous Ventilation: absent    Sedation Level:  Deep  Preoxygenated: Yes    Patient Position:  Sniffing  Mask Difficulty Assessment:  0 - not attempted  Final Airway Type:  Endotracheal airway  Final Endotracheal Airway:  ETT - double lumen left with ONE LUNG VENTILATION  Cuffed: Yes    Technique Used for Successful ETT Placement:  Direct laryngoscopy  Devices/Methods Used in Placement:  Cricoid pressure    Insertion Site:  Oral  Blade Type:  Shy  Laryngoscope Blade/Videolaryngoscope Blade Size:  3  ETT Double Lumen (fr):  39  Measured from:  Teeth  ETT to Teeth (cm):  30  Placement Verified by: auscultation and capnometry    Cormack-Lehane Classification:  Grade IIb - view of arytenoids or posterior of glottis only  Number of Attempts at Approach:  1

## 2023-10-09 NOTE — ANESTHESIA PROCEDURE NOTES
Arterial Line    Performed by: Rubén Murphy M.D.  Authorized by: Rubén Murphy M.D.    Start Time:  10/9/2023 12:18 PM  End Time:  10/9/2023 12:21 PM  Localization: surface landmarks    Patient Location:  OR  Indication: continuous blood pressure monitoring        Catheter Size:  20 G  Seldinger Technique?: Yes    Laterality:  Right  Site:  Radial artery  Line Secured:  Antimicrobial disc, tape and transparent dressing  Events: patient tolerated procedure well with no complications

## 2023-10-09 NOTE — ANESTHESIA TIME REPORT
Anesthesia Start and Stop Event Times     Date Time Event    10/9/2023 1136 Ready for Procedure     1207 Anesthesia Start     1522 Anesthesia Stop        Responsible Staff  10/09/23    Name Role Begin End    Rubén Murphy M.D. Anesth 1207 1522        Overtime Reason:  no overtime (within assigned shift)    Comments:

## 2023-10-10 ENCOUNTER — APPOINTMENT (OUTPATIENT)
Dept: RADIOLOGY | Facility: MEDICAL CENTER | Age: 63
DRG: 328 | End: 2023-10-10
Attending: PHYSICIAN ASSISTANT
Payer: COMMERCIAL

## 2023-10-10 PROBLEM — C15.9 ESOPHAGEAL CANCER (HCC): Status: ACTIVE | Noted: 2023-10-10

## 2023-10-10 LAB
ANION GAP SERPL CALC-SCNC: 11 MMOL/L (ref 7–16)
BUN SERPL-MCNC: 16 MG/DL (ref 8–22)
CALCIUM SERPL-MCNC: 10.3 MG/DL (ref 8.5–10.5)
CHLORIDE SERPL-SCNC: 108 MMOL/L (ref 96–112)
CO2 SERPL-SCNC: 25 MMOL/L (ref 20–33)
CREAT SERPL-MCNC: 1 MG/DL (ref 0.5–1.4)
ERYTHROCYTE [DISTWIDTH] IN BLOOD BY AUTOMATED COUNT: 48.2 FL (ref 35.9–50)
GFR SERPLBLD CREATININE-BSD FMLA CKD-EPI: 84 ML/MIN/1.73 M 2
GLUCOSE SERPL-MCNC: 161 MG/DL (ref 65–99)
HCT VFR BLD AUTO: 44.1 % (ref 42–52)
HGB BLD-MCNC: 14.2 G/DL (ref 14–18)
MAGNESIUM SERPL-MCNC: 1.6 MG/DL (ref 1.5–2.5)
MCH RBC QN AUTO: 30.1 PG (ref 27–33)
MCHC RBC AUTO-ENTMCNC: 32.2 G/DL (ref 32.3–36.5)
MCV RBC AUTO: 93.4 FL (ref 81.4–97.8)
PHOSPHATE SERPL-MCNC: 2 MG/DL (ref 2.5–4.5)
PLATELET # BLD AUTO: 191 K/UL (ref 164–446)
PMV BLD AUTO: 9.9 FL (ref 9–12.9)
POTASSIUM SERPL-SCNC: 4.4 MMOL/L (ref 3.6–5.5)
PREALB SERPL-MCNC: 18 MG/DL (ref 18–38)
RBC # BLD AUTO: 4.72 M/UL (ref 4.7–6.1)
SODIUM SERPL-SCNC: 144 MMOL/L (ref 135–145)
WBC # BLD AUTO: 16.7 K/UL (ref 4.8–10.8)

## 2023-10-10 PROCEDURE — 770001 HCHG ROOM/CARE - MED/SURG/GYN PRIV*

## 2023-10-10 PROCEDURE — 85027 COMPLETE CBC AUTOMATED: CPT

## 2023-10-10 PROCEDURE — 700111 HCHG RX REV CODE 636 W/ 250 OVERRIDE (IP): Mod: JZ | Performed by: PHYSICIAN ASSISTANT

## 2023-10-10 PROCEDURE — 700105 HCHG RX REV CODE 258: Performed by: PHYSICIAN ASSISTANT

## 2023-10-10 PROCEDURE — 84134 ASSAY OF PREALBUMIN: CPT

## 2023-10-10 PROCEDURE — 36415 COLL VENOUS BLD VENIPUNCTURE: CPT

## 2023-10-10 PROCEDURE — 84100 ASSAY OF PHOSPHORUS: CPT

## 2023-10-10 PROCEDURE — 83735 ASSAY OF MAGNESIUM: CPT

## 2023-10-10 PROCEDURE — 80048 BASIC METABOLIC PNL TOTAL CA: CPT

## 2023-10-10 PROCEDURE — 71045 X-RAY EXAM CHEST 1 VIEW: CPT

## 2023-10-10 RX ORDER — LORAZEPAM 2 MG/ML
0.5 INJECTION INTRAMUSCULAR EVERY 6 HOURS PRN
Status: DISCONTINUED | OUTPATIENT
Start: 2023-10-10 | End: 2023-10-13 | Stop reason: HOSPADM

## 2023-10-10 RX ORDER — ONDANSETRON 4 MG/1
4 TABLET, ORALLY DISINTEGRATING ORAL EVERY 4 HOURS PRN
Status: DISCONTINUED | OUTPATIENT
Start: 2023-10-10 | End: 2023-10-13 | Stop reason: HOSPADM

## 2023-10-10 RX ADMIN — MORPHINE SULFATE 2 MG: 4 INJECTION, SOLUTION INTRAMUSCULAR; INTRAVENOUS at 15:15

## 2023-10-10 RX ADMIN — MORPHINE SULFATE 2 MG: 4 INJECTION, SOLUTION INTRAMUSCULAR; INTRAVENOUS at 14:00

## 2023-10-10 RX ADMIN — CEFAZOLIN 2 G: 2 INJECTION, POWDER, FOR SOLUTION INTRAMUSCULAR; INTRAVENOUS at 04:08

## 2023-10-10 RX ADMIN — MORPHINE SULFATE 2 MG: 4 INJECTION, SOLUTION INTRAMUSCULAR; INTRAVENOUS at 19:29

## 2023-10-10 RX ADMIN — ENOXAPARIN SODIUM 40 MG: 100 INJECTION SUBCUTANEOUS at 08:53

## 2023-10-10 RX ADMIN — MORPHINE SULFATE 2 MG: 4 INJECTION, SOLUTION INTRAMUSCULAR; INTRAVENOUS at 04:05

## 2023-10-10 RX ADMIN — KETOROLAC TROMETHAMINE 30 MG: 30 INJECTION, SOLUTION INTRAMUSCULAR; INTRAVENOUS at 22:07

## 2023-10-10 RX ADMIN — KETOROLAC TROMETHAMINE 30 MG: 30 INJECTION, SOLUTION INTRAMUSCULAR; INTRAVENOUS at 04:50

## 2023-10-10 RX ADMIN — MORPHINE SULFATE 2 MG: 4 INJECTION, SOLUTION INTRAMUSCULAR; INTRAVENOUS at 08:53

## 2023-10-10 RX ADMIN — KETOROLAC TROMETHAMINE 30 MG: 30 INJECTION, SOLUTION INTRAMUSCULAR; INTRAVENOUS at 13:57

## 2023-10-10 ASSESSMENT — PAIN DESCRIPTION - PAIN TYPE
TYPE: ACUTE PAIN
TYPE: ACUTE PAIN
TYPE: ACUTE PAIN;SURGICAL PAIN
TYPE: ACUTE PAIN
TYPE: ACUTE PAIN;SURGICAL PAIN
TYPE: ACUTE PAIN

## 2023-10-10 ASSESSMENT — LIFESTYLE VARIABLES
HAVE PEOPLE ANNOYED YOU BY CRITICIZING YOUR DRINKING: NO
EVER FELT BAD OR GUILTY ABOUT YOUR DRINKING: NO
TOTAL SCORE: 0
HOW MANY TIMES IN THE PAST YEAR HAVE YOU HAD 5 OR MORE DRINKS IN A DAY: 0
EVER HAD A DRINK FIRST THING IN THE MORNING TO STEADY YOUR NERVES TO GET RID OF A HANGOVER: NO
HAVE YOU EVER FELT YOU SHOULD CUT DOWN ON YOUR DRINKING: NO
TOTAL SCORE: 0
ON A TYPICAL DAY WHEN YOU DRINK ALCOHOL HOW MANY DRINKS DO YOU HAVE: 1
TOTAL SCORE: 0
CONSUMPTION TOTAL: NEGATIVE
ALCOHOL_USE: YES
DOES PATIENT WANT TO STOP DRINKING: NO
AVERAGE NUMBER OF DAYS PER WEEK YOU HAVE A DRINK CONTAINING ALCOHOL: 1

## 2023-10-10 ASSESSMENT — PATIENT HEALTH QUESTIONNAIRE - PHQ9
2. FEELING DOWN, DEPRESSED, IRRITABLE, OR HOPELESS: SEVERAL DAYS
SUM OF ALL RESPONSES TO PHQ9 QUESTIONS 1 AND 2: 2
6. FEELING BAD ABOUT YOURSELF - OR THAT YOU ARE A FAILURE OR HAVE LET YOURSELF OR YOUR FAMILY DOWN: NOT AL ALL
7. TROUBLE CONCENTRATING ON THINGS, SUCH AS READING THE NEWSPAPER OR WATCHING TELEVISION: NOT AT ALL
1. LITTLE INTEREST OR PLEASURE IN DOING THINGS: SEVERAL DAYS
9. THOUGHTS THAT YOU WOULD BE BETTER OFF DEAD, OR OF HURTING YOURSELF: NOT AT ALL
8. MOVING OR SPEAKING SO SLOWLY THAT OTHER PEOPLE COULD HAVE NOTICED. OR THE OPPOSITE, BEING SO FIGETY OR RESTLESS THAT YOU HAVE BEEN MOVING AROUND A LOT MORE THAN USUAL: NOT AT ALL
SUM OF ALL RESPONSES TO PHQ QUESTIONS 1-9: 6
4. FEELING TIRED OR HAVING LITTLE ENERGY: MORE THAN HALF THE DAYS
3. TROUBLE FALLING OR STAYING ASLEEP OR SLEEPING TOO MUCH: MORE THAN HALF THE DAYS
5. POOR APPETITE OR OVEREATING: NOT AT ALL

## 2023-10-10 ASSESSMENT — COGNITIVE AND FUNCTIONAL STATUS - GENERAL
WALKING IN HOSPITAL ROOM: A LITTLE
SUGGESTED CMS G CODE MODIFIER DAILY ACTIVITY: CJ
DRESSING REGULAR LOWER BODY CLOTHING: A LITTLE
DAILY ACTIVITIY SCORE: 22
MOVING FROM LYING ON BACK TO SITTING ON SIDE OF FLAT BED: A LITTLE
SUGGESTED CMS G CODE MODIFIER MOBILITY: CK
CLIMB 3 TO 5 STEPS WITH RAILING: A LOT
MOBILITY SCORE: 18
MOVING TO AND FROM BED TO CHAIR: A LITTLE
DRESSING REGULAR UPPER BODY CLOTHING: A LITTLE
STANDING UP FROM CHAIR USING ARMS: A LITTLE

## 2023-10-10 NOTE — OR NURSING
Patient transported to T413 with RN x 2. Bedside handoff with Brook RN. Dressings and lines visualized and verified with receiving RN.

## 2023-10-10 NOTE — OR NURSING
Report called to Brook SOTO. Plan of care discussed. Patient reports tolerable pain to abdomen, returns to sleep with even and unlabored respirations. Patient expresses readiness to proceed to room. Lines and drains verified, output recorded via I/O. Right radial art line removed. Wrist remains CDI and soft.

## 2023-10-10 NOTE — DIETARY
"Nutrition Support Assessment:  Day 1 of admit.  Haider Priest Jr. is a 63 y.o. male with admitting DX of malignant neoplasm of esophagus.     Wt loss of 14-23 lbs in 6 months reported per admit screen. RD visited pt at bedside. Pt was sleeping, did not rouse to name. He appeared adequately nourished. Wt hx per chart review is listed below. Pt previously denied dysphagia or poor PO intake.     Current problem list:  None listed     Assessment:  Estimated Nutritional Needs based on:   Height: 172.7 cm (5' 8\")  Weight: 65 kg (143 lb 4.8 oz)  Weight to Use in Calculations: 65 kg (143 lb 4.8 oz) - admit stand up scale wt  Ideal Body Weight: 69.9 kg (154 lb)  Percent Ideal Body Weight: 93.1  Body mass index is 21.79 kg/m²., BMI classification: normal    Calculation/Equation: MSJ x 1.2 = 1699 kcals/day  Total Calories / day: 1700 - 1950 (Calories / k - 30)  Total Grams Protein / day: 91+  (Grams Protein / k.4+) - provide increased protein provision 2' hypermetabolic disease state     Evaluation:   EUS with GI in 2023 showed pathology consistent with adenocarcinoma.  Esophagectomy with gastric pull-up, jejunostomy placement 10/9.  Wt hx per chart review: 148 lbs 21; wt loss of 4% >1 year is not significant.   Glucose 161, Phos 2.0  Lovenox per MAR  Consult received to begin feeds via J-tube.  Specialized low volume tube feeding formula indicated to best meet pt's estimated nutrition needs. Please provide equivalent formula upon discharge. Pt will need a pump for home tube feeding.      Malnutrition Risk: Does not meet criteria per ASPEN guidelines at this time.      Recommendations/Plan:  Start Impact Peptide 1.5 @ 10 ml/hr. Advance per protocol to goal rate of 50 mL/hr. This provides 1800 kcals, 113 grams of protein and 924 mL of free water per day.  Fluids per MD: (IVF + TF = 100ml/hr), D/c IVF, saline lock, after one liter of fluid.  PO diet per MD.    RD will continue to follow.       "

## 2023-10-10 NOTE — PROGRESS NOTES
AA&Ox4. Denies CP/SOB.  Reporting 6/10 pain. Medicated per MAR.   Educated patient regarding pharmacologic and non pharmacologic modalities for pain management.  Skin per flowsheet. NG tube and chest tube in place.  Strict NPO, J tube in place. Denies N/V.  + void via jackson. Last BM PTA.  Pt ambulates SB assist.  All needs met at this time. Call light within reach. Pt calls appropriately. Bed low and locked, non skid socks in place. Hourly rounding in place.

## 2023-10-10 NOTE — PROGRESS NOTES
Received report from PAC-U.  No immediate distress.  A&Ox4  Bazan to DD  CT to -20 suction  NG to LCS  J tube capped with dressing in place  Oriented to room, educated on white board, TV, call light, call before fall, and plan of care.  Call light and personal belongings within reach.  Fall precautions and hourly rounding in place   Report given to NOC RN

## 2023-10-10 NOTE — PROGRESS NOTES
"Progress Note:    S: Doing well  Walked in halls  Pain minimal    O:  Recent Labs     10/10/23  0155   WBC 16.7*   RBC 4.72   HEMOGLOBIN 14.2   HEMATOCRIT 44.1   MCV 93.4   MCH 30.1   MCHC 32.2*   RDW 48.2   PLATELETCT 191   MPV 9.9     Recent Labs     10/10/23  0155   SODIUM 144   POTASSIUM 4.4   CHLORIDE 108   CO2 25   GLUCOSE 161*   BUN 16   CREATININE 1.00   CALCIUM 10.3     Recent Labs     10/09/23  1139   INR 1.02     Current Facility-Administered Medications   Medication Dose    Pharmacy Consult: Enteral tube insertion - review meds/change route/product selection  1 Each    ondansetron (Zofran ODT) dispertab 4 mg  4 mg    LORazepam (Ativan) injection 0.5 mg  0.5 mg    lactated ringers infusion      enoxaparin (Lovenox) inj 40 mg  40 mg    Pharmacy Consult Request ...Pain Management Review 1 Each  1 Each    diphenhydrAMINE (Benadryl) injection 25 mg  25 mg    ketorolac (Toradol) injection 30 mg  30 mg    morphine 4 MG/ML injection 2 mg  2 mg    HYDROcodone-acetaminophen 2.5-108 mg/5mL (Hycet) solution 5-10 mg  10-20 mL    labetalol (Normodyne/Trandate) injection 10 mg  10 mg    enalaprilat (Vasotec) injection 2.5 mg 2 mL  2.5 mg    hydrALAZINE (Apresoline) injection 10 mg  10 mg       PE:  BP (!) 168/100   Pulse 98   Temp 36.8 °C (98.2 °F) (Temporal)   Resp 19   Ht 1.727 m (5' 8\")   Wt 65 kg (143 lb 4.8 oz)   SpO2 92%     Intake/Output Summary (Last 24 hours) at 10/10/2023 1241  Last data filed at 10/10/2023 1227  Gross per 24 hour   Intake 1400 ml   Output 1837 ml   Net -437 ml       Chest clear  Heart regular  Chest tube serous    Rads:  DX-CHEST-PORTABLE (1 VIEW)   Final Result         1. Possible malpositioned nasogastric tube, as above. There is also possibility of free peritoneal air. Attending physician notified via secured text message at 0742 hours.   2. Cardiomegaly.   3. Right chest tube appears adequate, without pneumothorax.      DX-CHEST-LIMITED (1 VIEW)   Final Result         Gastric " drainage tube with tip in the stomach. Sidehole is in the lower esophagus.      Right chest tube with tip in the right lung apex.      Mild interstitial prominence could relate to mild fluid overload.      Small left basilar atelectasis.      Free air under diaphragm could relate to recent surgery.          A:   Active Hospital Problems    Diagnosis     Esophageal cancer (HCC) [C15.9]          P: Ambulate/IS  Add ativan prn rather than restarting nicotine patch  UGI tomorrow  Await path    John Ganser M.D.  [facility]

## 2023-10-10 NOTE — PROGRESS NOTES
"Bedside report received.  Assessment complete.  A&O x 4. Patient calls appropriately.  Bed alarm on.   Patient has 8/10 pain. Pain managed with prescribed medications.  Denies N&V. Strict NPO at this time  Jackson in place  Chest tube to -20 suction  NG tube to Low continuous suction  J tube capped, DIP.  +output in jackson, - flatus, - BM.  Patient denies SOB.  SCD's on.  Patient calm and cooperative with staff and POC at this time.  Review plan with of care with patient. Call light and personal belongings within reach. Hourly rounding in place. All needs met at this time.     BP (!) 150/105   Pulse (!) 112   Temp 36.1 °C (97 °F) (Temporal)   Resp 18   Ht 1.727 m (5' 8\")   Wt 65 kg (143 lb 4.8 oz)   SpO2 97%   BMI 21.79 kg/m²     "

## 2023-10-10 NOTE — CARE PLAN
The patient is Stable - Low risk of patient condition declining or worsening    Shift Goals  Clinical Goals: Monitor hemodynamic stability, provide medication PRN for pain, monitor chest tube, maintain NG tube  Patient Goals: comfort, pain managment    Progress made toward(s) clinical / shift goals:  Education provided regarding diet, call light use, POC, NG tube, chest tube, and pain management. Patient verbalizes understanding at this time.     Problem: Pain - Standard  Goal: Alleviation of pain or a reduction in pain to the patient’s comfort goal  Outcome: Progressing     Problem: Fall Risk  Goal: Patient will remain free from falls  Outcome: Progressing     Problem: Knowledge Deficit - Standard  Goal: Patient and family/care givers will demonstrate understanding of plan of care, disease process/condition, diagnostic tests and medications  Outcome: Progressing       Patient is not progressing towards the following goals:

## 2023-10-11 ENCOUNTER — APPOINTMENT (OUTPATIENT)
Dept: RADIOLOGY | Facility: MEDICAL CENTER | Age: 63
DRG: 328 | End: 2023-10-11
Attending: SURGERY
Payer: COMMERCIAL

## 2023-10-11 LAB
CRP SERPL HS-MCNC: 12.19 MG/DL (ref 0–0.75)
PREALB SERPL-MCNC: 15.9 MG/DL (ref 18–38)

## 2023-10-11 PROCEDURE — 84134 ASSAY OF PREALBUMIN: CPT

## 2023-10-11 PROCEDURE — 86140 C-REACTIVE PROTEIN: CPT

## 2023-10-11 PROCEDURE — 74240 X-RAY XM UPR GI TRC 1CNTRST: CPT

## 2023-10-11 PROCEDURE — A9270 NON-COVERED ITEM OR SERVICE: HCPCS | Performed by: PHYSICIAN ASSISTANT

## 2023-10-11 PROCEDURE — 700111 HCHG RX REV CODE 636 W/ 250 OVERRIDE (IP): Mod: JZ | Performed by: PHYSICIAN ASSISTANT

## 2023-10-11 PROCEDURE — 700102 HCHG RX REV CODE 250 W/ 637 OVERRIDE(OP): Performed by: PHYSICIAN ASSISTANT

## 2023-10-11 PROCEDURE — 700117 HCHG RX CONTRAST REV CODE 255: Performed by: SURGERY

## 2023-10-11 PROCEDURE — 36415 COLL VENOUS BLD VENIPUNCTURE: CPT

## 2023-10-11 PROCEDURE — 770001 HCHG ROOM/CARE - MED/SURG/GYN PRIV*

## 2023-10-11 RX ADMIN — HYDROCODONE BITARTRATE AND ACETAMINOPHEN 7.5 MG: 7.5; 325 SOLUTION ORAL at 16:29

## 2023-10-11 RX ADMIN — IOHEXOL 100 ML: 300 INJECTION, SOLUTION INTRAVENOUS at 09:15

## 2023-10-11 RX ADMIN — MORPHINE SULFATE 2 MG: 4 INJECTION, SOLUTION INTRAMUSCULAR; INTRAVENOUS at 09:33

## 2023-10-11 RX ADMIN — ENOXAPARIN SODIUM 40 MG: 100 INJECTION SUBCUTANEOUS at 05:09

## 2023-10-11 RX ADMIN — HYDROCODONE BITARTRATE AND ACETAMINOPHEN 7.5 MG: 7.5; 325 SOLUTION ORAL at 20:59

## 2023-10-11 RX ADMIN — HYDROCODONE BITARTRATE AND ACETAMINOPHEN 7.5 MG: 7.5; 325 SOLUTION ORAL at 11:58

## 2023-10-11 RX ADMIN — KETOROLAC TROMETHAMINE 30 MG: 30 INJECTION, SOLUTION INTRAMUSCULAR; INTRAVENOUS at 05:09

## 2023-10-11 ASSESSMENT — PAIN SCALES - GENERAL: PAIN_LEVEL: 5

## 2023-10-11 ASSESSMENT — PAIN DESCRIPTION - PAIN TYPE
TYPE: ACUTE PAIN

## 2023-10-11 NOTE — PROGRESS NOTES
"Bedside report received.  Assessment complete.  A&O x 4. Patient calls appropriately.  Patient ambulates with no assist. Bed alarm off.   Patient has 3-8/10 pain. Pain managed with prescribed medications.  Denies N&V. Tolerating clear liquid diet.  Surgical dressings CDI. Chest tube patent to -20 suction. Jtube continuous tube feed running.  + void, + flatus, last BM PTA.  Patient denies SOB.  SCD's on.  Patient is pleasant and cooperative with the care plan.  Review plan with of care with patient. Call light and personal belongings within reach. Hourly rounding in place. All needs met at this time.   /89   Pulse (!) 112   Temp 37.2 °C (99 °F) (Temporal)   Resp 18   Ht 1.727 m (5' 8\")   Wt 65 kg (143 lb 4.8 oz)   SpO2 90%   BMI 21.79 kg/m²     "

## 2023-10-11 NOTE — PROGRESS NOTES
"Progress Note:    S: Doing well  Bazan out, urinating  Pain minimal    O:  Recent Labs     10/10/23  0155   WBC 16.7*   RBC 4.72   HEMOGLOBIN 14.2   HEMATOCRIT 44.1   MCV 93.4   MCH 30.1   MCHC 32.2*   RDW 48.2   PLATELETCT 191   MPV 9.9     Recent Labs     10/10/23  0155   SODIUM 144   POTASSIUM 4.4   CHLORIDE 108   CO2 25   GLUCOSE 161*   BUN 16   CREATININE 1.00   CALCIUM 10.3     Recent Labs     10/09/23  1139   INR 1.02     Current Facility-Administered Medications   Medication Dose    Pharmacy Consult: Enteral tube insertion - review meds/change route/product selection  1 Each    ondansetron (Zofran ODT) dispertab 4 mg  4 mg    LORazepam (Ativan) injection 0.5 mg  0.5 mg    lactated ringers infusion      enoxaparin (Lovenox) inj 40 mg  40 mg    Pharmacy Consult Request ...Pain Management Review 1 Each  1 Each    diphenhydrAMINE (Benadryl) injection 25 mg  25 mg    ketorolac (Toradol) injection 30 mg  30 mg    morphine 4 MG/ML injection 2 mg  2 mg    HYDROcodone-acetaminophen 2.5-108 mg/5mL (Hycet) solution 5-10 mg  10-20 mL    labetalol (Normodyne/Trandate) injection 10 mg  10 mg    enalaprilat (Vasotec) injection 2.5 mg 2 mL  2.5 mg    hydrALAZINE (Apresoline) injection 10 mg  10 mg       PE:  BP (!) 127/92   Pulse (!) 110   Temp 37.2 °C (99 °F) (Temporal)   Resp 18   Ht 1.727 m (5' 8\")   Wt 65 kg (143 lb 4.8 oz)   SpO2 91%     Intake/Output Summary (Last 24 hours) at 10/11/2023 0954  Last data filed at 10/11/2023 0931  Gross per 24 hour   Intake 1995.09 ml   Output 3420 ml   Net -1424.91 ml       Chest clear  Abd soft  Chest tube serous    Rads:  DX-UPPER GI-SERIES WITH KUB   Final Result      Postsurgical changes consistent with gastric pull-up. No evidence of contrast extravasation to suggest leak.      DX-CHEST-PORTABLE (1 VIEW)   Final Result         1. Possible malpositioned nasogastric tube, as above. There is also possibility of free peritoneal air. Attending physician notified via secured text " message at 0742 hours.   2. Cardiomegaly.   3. Right chest tube appears adequate, without pneumothorax.      DX-CHEST-LIMITED (1 VIEW)   Final Result         Gastric drainage tube with tip in the stomach. Sidehole is in the lower esophagus.      Right chest tube with tip in the right lung apex.      Mild interstitial prominence could relate to mild fluid overload.      Small left basilar atelectasis.      Free air under diaphragm could relate to recent surgery.          A:   Active Hospital Problems    Diagnosis     Esophageal cancer (HCC) [C15.9]          P: UGI looks good, no leak and stomach empties  Remove NG, sips and chips  Clears in AM  Await path    John Ganser M.D.  [facility]

## 2023-10-11 NOTE — DISCHARGE PLANNING
Case Management Discharge Planning    Admission Date: 10/9/2023  GMLOS: 2.2  ALOS: 2    6-Clicks ADL Score: 22  6-Clicks Mobility Score: 18      Anticipated Discharge Dispo: Discharge Disposition: Discharged to home/self care (01) with close OP follow up    DME Needed: No    Action(s) Taken: Updated Provider/Nurse on Discharge Plan    Pt was discussed in IDT rounds today. Pt is scheduled for UGI today .    Per Dominga of [facility] they have accepted Pt for enteral tube feeds set up prior to this hospitalization.   [facility] needs to know when Pt is discharging so they can prepare Pt's supplies and Dominga to reach out to Pt and family.    Escalations Completed: None    Medically Clear: No    Next Steps:   CM to continue to assist Pt with discharge as needed    Barriers to Discharge:   Medical clearance    Is the patient up for discharge tomorrow: No

## 2023-10-11 NOTE — CARE PLAN
Problem: Pain - Standard  Goal: Alleviation of pain or a reduction in pain to the patient’s comfort goal  Outcome: Progressing     Problem: Fall Risk  Goal: Patient will remain free from falls  Outcome: Progressing     Problem: Knowledge Deficit - Standard  Goal: Patient and family/care givers will demonstrate understanding of plan of care, disease process/condition, diagnostic tests and medications  Outcome: Progressing   The patient is Stable - Low risk of patient condition declining or worsening    Shift Goals  Clinical Goals: monitor chest tube, NGT, tube feeds  Patient Goals: rest    Progress made toward(s) clinical / shift goals:  chest tube remained patent to -20 suction, NGT Dc'd, tube feeds at goal rate.    Patient is not progressing towards the following goals:

## 2023-10-11 NOTE — ANESTHESIA POSTPROCEDURE EVALUATION
Patient: Haider Priest Jr.    Procedure Summary     Date: 10/09/23 Room / Location: [facility] 09 / SURGERY [facility]    Anesthesia Start: 1207 Anesthesia Stop: 1522    Procedures:       ESOPHAGECTOMY WITH GASTRIC PULL UP, NODE DISSECTION, JEJUNOSTOMY PLACEMENT, LAPAROSCOPY THORACOSCOPY (Abdomen)      CREATION, GASTROSTOMY, LAPAROSCOPIC (Abdomen) Diagnosis: (MALIGNANT TUMOR OF ESOPHAGUS)    Surgeons: John H Ganser, M.D. Responsible Provider: Rbuén Murphy M.D.    Anesthesia Type: general ASA Status: 3          Final Anesthesia Type: general  Last vitals  BP   Blood Pressure: (!) 157/107, Arterial BP: 122/80    Temp   37.1 °C (98.8 °F)    Pulse   (!) 112   Resp   18    SpO2   90 %      Anesthesia Post Evaluation    Patient location during evaluation: PACU  Patient participation: complete - patient participated  Level of consciousness: awake and alert  Pain score: 5    Airway patency: patent  Anesthetic complications: no  Cardiovascular status: hemodynamically stable  Respiratory status: acceptable  Hydration status: euvolemic    PONV: none          No notable events documented.     Nurse Pain Score: 6 (NPRS)

## 2023-10-11 NOTE — PROGRESS NOTES
"Bedside report received.  Assessment complete.  A&O x 4. Patient calls appropriately.  Patient ambulates with STBY assist. Bed alarm ON.   Patient has 6-10/10 pain. Pain managed with prescribed medications.  Denies N&V. Tolerating TUBE FEED.  R-flank Chest tube to -20  LLQ J tube feeding impact peptide at 10ml/hr.  +output in jackson, + flatus, - BM.  Patient denies SOB.  SCD's on.  Patient calm and cooperative with staff and POC at this time.  Review plan with of care with patient. Call light and personal belongings within reach. Hourly rounding in place. All needs met at this time.     BP (!) 147/98   Pulse 70   Temp 36.8 °C (98.2 °F) (Temporal)   Resp 16   Ht 1.727 m (5' 8\")   Wt 65 kg (143 lb 4.8 oz)   SpO2 93%   BMI 21.79 kg/m²     "

## 2023-10-11 NOTE — CARE PLAN
The patient is Stable - Low risk of patient condition declining or worsening    Shift Goals  Clinical Goals: Pulmonary hygiene, Increased IS use  Patient Goals: Comfort    Progress made toward(s) clinical / shift goals:  provided education regarding increasing IS use and methods to improve pulmonary hygiene, patient verbalizes understanding.   Problem: Pain - Standard  Goal: Alleviation of pain or a reduction in pain to the patient’s comfort goal  Outcome: Progressing     Problem: Knowledge Deficit - Standard  Goal: Patient and family/care givers will demonstrate understanding of plan of care, disease process/condition, diagnostic tests and medications  Outcome: Progressing       Patient is not progressing towards the following goals:

## 2023-10-11 NOTE — CARE PLAN
The patient is Stable - Low risk of patient condition declining or worsening    Shift Goals  Clinical Goals: pulmonary hygiene; pain control  Patient Goals: pain control; comfort    Progress made toward(s) clinical / shift goals:       Patient is not progressing towards the following goals:

## 2023-10-12 PROCEDURE — A9270 NON-COVERED ITEM OR SERVICE: HCPCS | Performed by: PHYSICIAN ASSISTANT

## 2023-10-12 PROCEDURE — 700102 HCHG RX REV CODE 250 W/ 637 OVERRIDE(OP): Performed by: PHYSICIAN ASSISTANT

## 2023-10-12 PROCEDURE — 770001 HCHG ROOM/CARE - MED/SURG/GYN PRIV*

## 2023-10-12 PROCEDURE — 700111 HCHG RX REV CODE 636 W/ 250 OVERRIDE (IP): Mod: JZ | Performed by: PHYSICIAN ASSISTANT

## 2023-10-12 RX ADMIN — MORPHINE SULFATE 2 MG: 4 INJECTION, SOLUTION INTRAMUSCULAR; INTRAVENOUS at 19:55

## 2023-10-12 RX ADMIN — KETOROLAC TROMETHAMINE 30 MG: 30 INJECTION, SOLUTION INTRAMUSCULAR; INTRAVENOUS at 00:06

## 2023-10-12 RX ADMIN — HYDROCODONE BITARTRATE AND ACETAMINOPHEN 7.5 MG: 7.5; 325 SOLUTION ORAL at 06:26

## 2023-10-12 RX ADMIN — ENOXAPARIN SODIUM 40 MG: 100 INJECTION SUBCUTANEOUS at 06:26

## 2023-10-12 RX ADMIN — MORPHINE SULFATE 2 MG: 4 INJECTION, SOLUTION INTRAMUSCULAR; INTRAVENOUS at 12:53

## 2023-10-12 RX ADMIN — HYDROCODONE BITARTRATE AND ACETAMINOPHEN 7.5 MG: 7.5; 325 SOLUTION ORAL at 21:17

## 2023-10-12 ASSESSMENT — PAIN DESCRIPTION - PAIN TYPE
TYPE: ACUTE PAIN

## 2023-10-12 NOTE — CARE PLAN
The patient is Stable - Low risk of patient condition declining or worsening    Shift Goals  Clinical Goals: Chest Tube Management; Monitor Tube Feeds; Pain Control  Patient Goals: Pain Control; Comfort; Discharge w/ No Pain    Progress made toward(s) clinical / shift goals:  Patient medicated per MAR. Non-pharmacologic comfort measures implemented. Safety discussed. Education provided. Ambulation and repositioning encouraged.     Problem: Pain - Standard  Goal: Alleviation of pain or a reduction in pain to the patient’s comfort goal  Outcome: Progressing     Problem: Fall Risk  Goal: Patient will remain free from falls  Outcome: Progressing     Problem: Knowledge Deficit - Standard  Goal: Patient and family/care givers will demonstrate understanding of plan of care, disease process/condition, diagnostic tests and medications  Outcome: Progressing

## 2023-10-12 NOTE — CARE PLAN
Problem: Pain - Standard  Goal: Alleviation of pain or a reduction in pain to the patient’s comfort goal  Outcome: Progressing     Problem: Fall Risk  Goal: Patient will remain free from falls  Outcome: Progressing     Problem: Knowledge Deficit - Standard  Goal: Patient and family/care givers will demonstrate understanding of plan of care, disease process/condition, diagnostic tests and medications  Outcome: Progressing   The patient is Stable - Low risk of patient condition declining or worsening    Shift Goals  Clinical Goals: monitor chest tube, tube feeds, administer pain medication PRN  Patient Goals: rest, pain control    Progress made toward(s) clinical / shift goals:  Chest tube Dc'd by MD. Continuous tube feeds at goal of 50 mL/hr, patient tolerating along with clear liquid diet, advance to full liquid diet for breakfast 10/13. Pain medication administered PRN.    Patient is not progressing towards the following goals:

## 2023-10-12 NOTE — PROGRESS NOTES
Received report from previous shift RN at 1900.  Assessment complete.  A&O x 4. Patient calls appropriately.  Patient ambulates independently.   Patient has 6/10 pain. Pain managed with prescribed medications per MAR.  Denies N&V. Tolerating clear liquid diet.  Skin per flowsheets.  + void, + flatus, - BM.  Patient denies SOB on room air.    Patient pleasant and cooperative throughout assessment.  Reviewed plan of care with patient, pt verbalizes understanding. Call light and personal belongings with in reach. Hourly rounding in place. All needs met at this time.

## 2023-10-12 NOTE — PROGRESS NOTES
4 Eyes Skin Assessment Completed by CHARLES Wood and Maia RN.    Head WDL  Ears WDL  Nose WDL  Mouth WDL  Neck WDL  Breast/Chest R chest tube with DIP  Shoulder Blades WDL  Spine WDL  (R) Arm/Elbow/Hand WDL; PIV to RH; prev. Art line site with DIP.  (L) Arm/Elbow/Hand WDL  Abdomen x4 lap sites with DIP; J-tube to midline abdomen with DIP.  Groin WDL  Scrotum/Coccyx/Buttocks WDL  (R) Leg WDL  (L) Leg WDL  (R) Heel/Foot/Toe WDL  (L) Heel/Foot/Toe WDL          Devices In Places Blood Pressure Cuff, Pulse Ox, and G Tube (J**)      Interventions In Place Pillows, Dri-Brooks Pads, and Pressure Redistribution Mattress    Possible Skin Injury No    Pictures Uploaded Into Epic N/A  Wound Consult Placed N/A  RN Wound Prevention Protocol Ordered No

## 2023-10-12 NOTE — PROGRESS NOTES
"Bedside report received.  Assessment complete.  A&O x 4. Patient calls appropriately.  Patient ambulates with no assist. Bed alarm off.   Patient has 0-8/10 pain. Pain managed with prescribed medications.  Denies N&V. Tolerating clear liquid diet and tube feed via Jtube.  Surgical site CDI. Chest tube patent to -20 suction.  + void, + flatus, last BM PTA.  Patient denies SOB.  SCD's refused.  Patient is pleasant and cooperative with the care plan.  Review plan with of care with patient. Call light and personal belongings within reach. Hourly rounding in place. All needs met at this time.   BP (!) 145/97   Pulse 76   Temp 36.8 °C (98.2 °F) (Temporal)   Resp 20   Ht 1.727 m (5' 8\")   Wt 65 kg (143 lb 4.8 oz)   SpO2 91%   BMI 21.79 kg/m²     "

## 2023-10-12 NOTE — PROGRESS NOTES
"Progress Note:    S: Doing well  Tolerating clear liquids    O:  Recent Labs     10/10/23  0155   WBC 16.7*   RBC 4.72   HEMOGLOBIN 14.2   HEMATOCRIT 44.1   MCV 93.4   MCH 30.1   MCHC 32.2*   RDW 48.2   PLATELETCT 191   MPV 9.9     Recent Labs     10/10/23  0155   SODIUM 144   POTASSIUM 4.4   CHLORIDE 108   CO2 25   GLUCOSE 161*   BUN 16   CREATININE 1.00   CALCIUM 10.3         Current Facility-Administered Medications   Medication Dose    Pharmacy Consult: Enteral tube insertion - review meds/change route/product selection  1 Each    ondansetron (Zofran ODT) dispertab 4 mg  4 mg    LORazepam (Ativan) injection 0.5 mg  0.5 mg    lactated ringers infusion      enoxaparin (Lovenox) inj 40 mg  40 mg    Pharmacy Consult Request ...Pain Management Review 1 Each  1 Each    diphenhydrAMINE (Benadryl) injection 25 mg  25 mg    ketorolac (Toradol) injection 30 mg  30 mg    morphine 4 MG/ML injection 2 mg  2 mg    HYDROcodone-acetaminophen 2.5-108 mg/5mL (Hycet) solution 5-10 mg  10-20 mL    labetalol (Normodyne/Trandate) injection 10 mg  10 mg    enalaprilat (Vasotec) injection 2.5 mg 2 mL  2.5 mg    hydrALAZINE (Apresoline) injection 10 mg  10 mg       PE:  BP (!) 152/98   Pulse (!) 110   Temp 36.5 °C (97.7 °F) (Temporal)   Resp 18   Ht 1.727 m (5' 8\")   Wt 65 kg (143 lb 4.8 oz)   SpO2 91%     Intake/Output Summary (Last 24 hours) at 10/12/2023 1303  Last data filed at 10/12/2023 1253  Gross per 24 hour   Intake 1720 ml   Output 1685 ml   Net 35 ml       Chest tube serous    Rads:  DX-UPPER GI-SERIES WITH KUB   Final Result      Postsurgical changes consistent with gastric pull-up. No evidence of contrast extravasation to suggest leak.      DX-CHEST-PORTABLE (1 VIEW)   Final Result         1. Possible malpositioned nasogastric tube, as above. There is also possibility of free peritoneal air. Attending physician notified via secured text message at 0742 hours.   2. Cardiomegaly.   3. Right chest tube appears adequate, " without pneumothorax.      DX-CHEST-LIMITED (1 VIEW)   Final Result         Gastric drainage tube with tip in the stomach. Sidehole is in the lower esophagus.      Right chest tube with tip in the right lung apex.      Mild interstitial prominence could relate to mild fluid overload.      Small left basilar atelectasis.      Free air under diaphragm could relate to recent surgery.          A:   Active Hospital Problems    Diagnosis     Esophageal cancer (HCC) [C15.9]          P: Chest tube removed  Discussed pathology  Advance diet  Home 1-2 days    John Ganser M.D.  [facility]

## 2023-10-12 NOTE — DIETARY
Nutrition Update:    Day 3 of admit.  Haider Priest Jr. is a 63 y.o. male with admitting DX of Malignant neoplasm of esophagus, unspecified (HCC) [C15.9].  Patient being followed to optimize nutrition.    PO diet initiated on 10/12/23. Current diet: clear liquids. PO intake per chart review % x 2 meals per ADLs. Tube feeding Impact Peptide 1.5 running at goal rate 50 mL/hr per flowsheets. RD following for diet advancement beyond clear liquids and will make adjustments to tube feeding regimen as indicated; recommend continuing NG feeds until diet advanced beyond clear liquids and pt demonstrates consistent adequate PO intake.    Problem: Nutritional:  Goal: Nutrition support tolerated and meeting greater than 85% of estimated needs  Outcome: Met    RD continues to follow.

## 2023-10-13 ENCOUNTER — PHARMACY VISIT (OUTPATIENT)
Dept: PHARMACY | Facility: MEDICAL CENTER | Age: 63
End: 2023-10-13
Payer: MEDICARE

## 2023-10-13 VITALS
BODY MASS INDEX: 20.92 KG/M2 | HEIGHT: 68 IN | TEMPERATURE: 97.7 F | DIASTOLIC BLOOD PRESSURE: 96 MMHG | SYSTOLIC BLOOD PRESSURE: 148 MMHG | HEART RATE: 110 BPM | OXYGEN SATURATION: 96 % | RESPIRATION RATE: 20 BRPM | WEIGHT: 138.01 LBS

## 2023-10-13 PROCEDURE — 700102 HCHG RX REV CODE 250 W/ 637 OVERRIDE(OP): Performed by: PHYSICIAN ASSISTANT

## 2023-10-13 PROCEDURE — RXMED WILLOW AMBULATORY MEDICATION CHARGE: Performed by: PHYSICIAN ASSISTANT

## 2023-10-13 PROCEDURE — 700111 HCHG RX REV CODE 636 W/ 250 OVERRIDE (IP): Mod: JZ | Performed by: PHYSICIAN ASSISTANT

## 2023-10-13 PROCEDURE — A9270 NON-COVERED ITEM OR SERVICE: HCPCS | Performed by: PHYSICIAN ASSISTANT

## 2023-10-13 RX ADMIN — ENOXAPARIN SODIUM 40 MG: 100 INJECTION SUBCUTANEOUS at 05:53

## 2023-10-13 RX ADMIN — HYDROCODONE BITARTRATE AND ACETAMINOPHEN 7.5 MG: 7.5; 325 SOLUTION ORAL at 12:12

## 2023-10-13 RX ADMIN — HYDROCODONE BITARTRATE AND ACETAMINOPHEN 7.5 MG: 7.5; 325 SOLUTION ORAL at 05:53

## 2023-10-13 RX ADMIN — MORPHINE SULFATE 2 MG: 4 INJECTION, SOLUTION INTRAMUSCULAR; INTRAVENOUS at 15:31

## 2023-10-13 RX ADMIN — HYDROCODONE BITARTRATE AND ACETAMINOPHEN 7.5 MG: 7.5; 325 SOLUTION ORAL at 01:46

## 2023-10-13 RX ADMIN — MORPHINE SULFATE 2 MG: 4 INJECTION, SOLUTION INTRAMUSCULAR; INTRAVENOUS at 09:44

## 2023-10-13 ASSESSMENT — PAIN DESCRIPTION - PAIN TYPE
TYPE: ACUTE PAIN

## 2023-10-13 ASSESSMENT — ENCOUNTER SYMPTOMS
VOMITING: 0
HEARTBURN: 0
NAUSEA: 0
ABDOMINAL PAIN: 0
SHORTNESS OF BREATH: 0
FEVER: 0
CHILLS: 0

## 2023-10-13 NOTE — CARE PLAN
Problem: Pain - Standard  Goal: Alleviation of pain or a reduction in pain to the patient’s comfort goal  10/13/2023 1614 by Machelle Davis R.N.  Outcome: Met  10/13/2023 1234 by Machelle Davis R.N.  Outcome: Progressing     Problem: Fall Risk  Goal: Patient will remain free from falls  10/13/2023 1614 by Machelle Davis R.N.  Outcome: Met  10/13/2023 1234 by GOYO GarciaN.  Outcome: Progressing     Problem: Knowledge Deficit - Standard  Goal: Patient and family/care givers will demonstrate understanding of plan of care, disease process/condition, diagnostic tests and medications  10/13/2023 1614 by Machelle Davis R.N.  Outcome: Met  10/13/2023 1234 by Machelle Davis R.N.  Outcome: Progressing

## 2023-10-13 NOTE — PROGRESS NOTES
DC instructions reviewed w/ pt, verbalized understanding. PIV dc'd, armband removed.  Meds to bed delivered.

## 2023-10-13 NOTE — DISCHARGE INSTRUCTIONS
Pt has received instruction/supplies from  re: TF's. Pt will run cyclic TF's at night.  1. Continue full liquids during the day until postop appt.  2. Discharge home today when alert, comfortable, ambulatory, and tolerating PO well  3. Pt counseled re: diet , activity, wound care, I.S., and home med's  4. May remove abdominal Tegaderms tomorrow and ok to shower.   5. Remove right chest Tegaderms in 2 days.  6. No baths, hot tubs, soaks.  7. No lifting >15 lbs for 3 wks  8. No driving for 4-5 days   9. F/U with Dr. Ganser in 1-2 weeks  10. All oral medications larger in diameter than a pencil eraser need to be crushed or converted to liquid form.      CALL IF YOU:   (1) fevers greater than 101.0 degrees F.  (2) Unusual chest or leg pain, redness or swelling behind the knee or in the calf muscle.  (3) Drainage or fluid from incision that may be foul smelling, increased tenderness or soreness at the wound or the wound edges are no longer together, redness or swelling at the incision site.  (4) Severe, and/or progressively worsening shortness of breath.    (5) Please do not hesitate to call with any other questions. ([phone])

## 2023-10-13 NOTE — PROGRESS NOTES
Surgical Progress Note    Author: RITU Acosta Date & Time created: 10/13/2023   12:52 PM     Interval Events:  S/p Minimally invasive esophagectomy with gastric pull-up, jejunostomy placement - POD#4, doing very well; ambulatory; boone full liquids/boone TF @ 50ml/hr; using IS; wants to go home    Review of Systems   Constitutional:  Negative for chills and fever.   Respiratory:  Negative for shortness of breath.    Gastrointestinal:  Negative for abdominal pain, heartburn, nausea and vomiting.   Skin:  Negative for itching and rash.     Hemodynamics:  Temp (24hrs), Av.7 °C (98 °F), Min:36.5 °C (97.7 °F), Max:37 °C (98.6 °F)  Temperature: 36.5 °C (97.7 °F)  Pulse  Av.9  Min: 63  Max: 112   Blood Pressure: (!) 148/96 (Rn notified )     Respiratory:    Respiration: 20, Pulse Oximetry: 96 %     Work Of Breathing / Effort: Within Normal Limits  RUL Breath Sounds: Clear, RML Breath Sounds: Clear, RLL Breath Sounds: Diminished, BAYRON Breath Sounds: Clear, LLL Breath Sounds: Diminished  Neuro:  GCS       Fluids:    Intake/Output Summary (Last 24 hours) at 10/13/2023 1252  Last data filed at 10/13/2023 1212  Gross per 24 hour   Intake 1202 ml   Output 1200 ml   Net 2 ml     Weight: 62.6 kg (138 lb 0.1 oz)  Current Diet Order   Procedures    Diet: Diet Tube Feed; Formula: Impact Peptide 1.5; Goal Rate (mL/Hour): 50; Duration: 24 HR; Tube Feed Time Unit: Hours/Day    Diet Order Diet: Full Liquid (No carbonation); Tray Modifications (optional): Small Meals     Physical Exam  Vitals and nursing note reviewed.   Constitutional:       General: He is not in acute distress.  Cardiovascular:      Rate and Rhythm: Normal rate.   Pulmonary:      Effort: Pulmonary effort is normal. No respiratory distress.      Comments: Teg's c/d/i  Abdominal:      Palpations: Abdomen is soft.      Comments: Teg's c/d/i  J-tube site clear   Skin:     General: Skin is warm and dry.   Neurological:      Mental Status: He is alert and  oriented to person, place, and time.   Psychiatric:         Mood and Affect: Mood normal.       Labs:  No results found for this or any previous visit (from the past 24 hour(s)).  Medical Decision Making, by Problem:  Active Hospital Problems    Diagnosis     Esophageal cancer (HCC) [C15.9]      Plan:  Pt has received instruction/supplies from  re: TF's. Pt will run cyclic TF's at night.  1. Continue full liquids during the day until postop appt.  2. Discharge home today when alert, comfortable, ambulatory, and tolerating PO well  3. Pt counseled re: diet , activity, wound care, I.S., and home med's  4. May remove abdominal Tegaderms tomorrow and ok to shower.   5. Remove right chest Tegaderms in 2 days.  6. No baths, hot tubs, soaks.  7. No lifting >15 lbs for 3 wks  8. No driving for 4-5 days   9. F/U with Dr. Ganser in 1-2 weeks  10. All oral medications larger in diameter than a pencil eraser need to be crushed or converted to liquid form.      CALL IF YOU:   (1) fevers greater than 101.0 degrees F.  (2) Unusual chest or leg pain, redness or swelling behind the knee or in the calf muscle.  (3) Drainage or fluid from incision that may be foul smelling, increased tenderness or soreness at the wound or the wound edges are no longer together, redness or swelling at the incision site.  (4) Severe, and/or progressively worsening shortness of breath.    (5) Please do not hesitate to call with any other questions. ([phone])     Quality Measures:  Quality-Core Measures   Reviewed items::  Labs reviewed, Medications reviewed and Radiology images reviewed  Bazan catheter::  No Bazan  DVT prophylaxis pharmacological::  Enoxaparin (Lovenox)  DVT prophylaxis - mechanical:  SCDs  Ulcer Prophylaxis::  Yes      Discussed patient condition with RN, Patient, and Dr. Ganser

## 2023-10-13 NOTE — CARE PLAN
Problem: Pain - Standard  Goal: Alleviation of pain or a reduction in pain to the patient’s comfort goal  Outcome: Progressing     Problem: Fall Risk  Goal: Patient will remain free from falls  Outcome: Progressing     Problem: Knowledge Deficit - Standard  Goal: Patient and family/care givers will demonstrate understanding of plan of care, disease process/condition, diagnostic tests and medications  Outcome: Progressing   The patient is Stable - Low risk of patient condition declining or worsening    Shift Goals  Clinical Goals: monitor tube feed, administer pain meds prn  Patient Goals: rest    Progress made toward(s) clinical / shift goals:  Patient tolerating tube feeds at goal of 50ml/hr. Pain medication administered prn.    Patient is not progressing towards the following goals:

## 2023-10-13 NOTE — PROGRESS NOTES
Bedside report received.  Assessment complete.  A&O x 4. Patient calls appropriately.  Patient ambulates with no assist. Bed alarm off.   Patient has 0-10/10 pain. Pain managed with prescribed medications.  Denies N&V. Tolerating full liquid diet. Tube feeds running at 50 ml/hr through J-tube.  Surgical dressings CDI.  + void, + flatus, - BM.  Patient denies SOB.  SCD's refused.  Patient is pleasant, and cooperative with the care plan.  Review plan with of care with patient. Call light and personal belongings within reach. Hourly rounding in place. All needs met at this time.

## 2023-10-13 NOTE — CARE PLAN
The patient is Stable - Low risk of patient condition declining or worsening    Shift Goals  Clinical Goals: Monitor Tube Feeds; Pain Control; OOB Activity  Patient Goals: Pain Control; Comfort    Progress made toward(s) clinical / shift goals:  Patient medicated per MAR. Non-pharmacologic comfort measures implemented. Safety discussed. Education provided. Ambulation and repositioning encouraged.     Problem: Pain - Standard  Goal: Alleviation of pain or a reduction in pain to the patient’s comfort goal  Outcome: Progressing     Problem: Fall Risk  Goal: Patient will remain free from falls  Outcome: Progressing     Problem: Knowledge Deficit - Standard  Goal: Patient and family/care givers will demonstrate understanding of plan of care, disease process/condition, diagnostic tests and medications  Outcome: Progressing

## 2023-10-13 NOTE — PROGRESS NOTES
"Received report from previous shift RN at 1900.  Assessment complete.  A&O x 4. Patient calls appropriately.  Patient ambulates independently.  Patient has 8/10 pain. Pain managed with prescribed medications per MAR.  Denies N&V. Tolerating clear liquid diet. Tolerating Impact Peptide 1.5 @50mL via J-tube.  Skin per flowsheets.  + void, + flatus, - BM.  Patient denies SOB on room air.  BP (!) 138/91   Pulse 89   Temp 36.6 °C (97.9 °F) (Temporal)   Resp 20   Ht 1.727 m (5' 8\")   Wt 61.6 kg (135 lb 12.9 oz)   SpO2 90%   BMI 20.65 kg/m²      Patient pleasant and cooperative throughout assessment.  Reviewed plan of care with patient, pt verbalizes understanding. Call light and personal belongings with in reach. Hourly rounding in place. All needs met at this time.    "

## 2023-10-13 NOTE — DISCHARGE PLANNING
Care Transition Team Discharge Planning    Anticipated Discharge Information  Discharge Disposition: Discharged to home/self care (01)              Discharge Plan:  Home with Family with close OP follow up    Per Dominga of [facility], Pt's J tube feeding education is complete and supplies will be delivered to Pt's home tonight. Pt was discussed in IDT rounds today.

## 2023-10-13 NOTE — PROGRESS NOTES
Discharge Lounge order placed and patient educated. Care plan and patient education completed. All belongings returned to patient. Medication delivered to D/C lounge. Patient transported via wheelchair to discharge lounge. Family instructed to pickup patient at [facility].

## 2023-10-17 NOTE — DISCHARGE SUMMARY
DATE OF ADMISSION:  10/09/2023   DATE OF DISCHARGE:  10/13/2023     ADMITTING DIAGNOSIS:  Distal esophageal cancer.     DISCHARGE DIAGNOSIS:  Distal esophageal cancer.     OPERATION PERFORMED:  Minimally invasive esophagectomy with gastric pull-up,   jejunostomy placement, performed by Dr. Ganser on 10/9/2023.     INDICATIONS:  The patient was diagnosed several months ago with esophageal   cancer and has undergone endoscopic mucosal resection with a positive margin.    There was no uptake on PET scan, so it was decided to proceed with   esophagectomy.  After involved preoperative education and informed consent   process, the patient was brought to the operating room for the aforementioned   procedure.     HOSPITAL COURSE:  After the procedure, the patient went to the general   surgical unit in stable condition.  On postoperative day #2, the patient   underwent upper GI that showed no evidence of dye extravasation.  Therefore,   the NG tube was removed and he was allowed to begin clear liquids.     Over the next 2 days, the patient continued to improve and did very well.  The   chest tube was removed on postoperative day #3. On   postoperative day #4, at the time of discharge, patient's vital signs are   stable and he is afebrile.  He is ambulatory and tolerating full liquids well.    Pain is well controlled.  His wounds are clear, both on his abdomen and his   chest.  He has been tolerating jejunostomy tube feeds well.   t     DISPOSITION:  The patient will be discharged home.     DISCHARGE INSTRUCTIONS:  The patient will follow up with Dr. Ganser in the   next 1-2 weeks.  Arrangements have already been made for education and   supplies for the patient to do nocturnal, cyclic tube feeds at home.  He is   counseled extensively regarding diet, activity, wound care, and home   medications.  He will stay on full liquids until his postop visit.        ______________________________  MELISSA CHAPPELL  PA-C        ______________________________  JOHN H. GANSER, MD CJS/DEMETRA/LEI    DD:  10/16/2023 14:21  DT:  10/16/2023 18:05    Job#:  [id number]    CC:Denver Miller, MD JOHN H. GANSER, MD

## 2024-01-25 ENCOUNTER — HOSPITAL ENCOUNTER (OUTPATIENT)
Dept: RADIOLOGY | Facility: MEDICAL CENTER | Age: 64
End: 2024-01-25
Attending: SURGERY
Payer: COMMERCIAL

## 2024-01-25 VITALS — OXYGEN SATURATION: 86 % | HEART RATE: 118 BPM

## 2024-01-25 DIAGNOSIS — Z93.4 OTHER ARTIFICIAL OPENINGS OF GASTROINTESTINAL TRACT STATUS (HCC): ICD-10-CM

## 2024-01-25 PROCEDURE — 700117 HCHG RX CONTRAST REV CODE 255: Performed by: RADIOLOGY

## 2024-01-25 PROCEDURE — B4087 GASTRO/JEJUNO TUBE, STD: HCPCS

## 2024-01-25 RX ADMIN — IOHEXOL 20 ML: 300 INJECTION, SOLUTION INTRAVENOUS at 14:45

## 2024-01-25 NOTE — PROGRESS NOTES
Pt presents to IR3. Mr. Priest was consented by MD at bedside, confirmed by this RN and consent at bedside. Pt transferred to IR3 table in Supine position. Patient underwent a J Tube Exchange by Dr. Davidson. Procedure site was marked by MD and verified using imaging guidance. Pt placed on SpO2 monitor, prepped and draped in a sterile fashion. Vitals were taken every 5 minutes and remained stable during procedure (see doc flow sheet for results). Pt walked to [facility] with RN.    Matt ARRON Jejunal Feeding Tube   REF: [id number]  LOT: [id number]  EXP: 2024-08-23

## 2024-01-25 NOTE — OR SURGEON
Immediate Post- Operative Note        Findings: J tube almost all the way out      Procedure(s): J tube replacement with upsizing to 16F  Ok to use      Estimated Blood Loss: Less than 5 ml        Complications: None            1/25/2024     2:11 PM     Schuyler Davidson M.D.

## 2024-02-06 ENCOUNTER — HOSPITAL ENCOUNTER (OUTPATIENT)
Dept: RADIOLOGY | Facility: MEDICAL CENTER | Age: 64
End: 2024-02-06
Payer: COMMERCIAL

## 2024-03-01 ENCOUNTER — PATIENT MESSAGE (OUTPATIENT)
Dept: HEALTH INFORMATION MANAGEMENT | Facility: OTHER | Age: 64
End: 2024-03-01

## (undated) DEVICE — SUTURE 3-0 SILK SH C/R 18 IN - (12/BX)

## (undated) DEVICE — DRESSING NON-ADHERING 8 X 3 - (50/BX)

## (undated) DEVICE — CANNULA W/SEAL 5X100 Z-THRE - ADED KII (12/BX)

## (undated) DEVICE — SUCTION INSTRUMENT YANKAUER BULBOUS TIP W/O VENT (50EA/CA)

## (undated) DEVICE — ENDOSTITCH10MM SUTURING DEVIC - (3/CA)

## (undated) DEVICE — SUTURE 4-0 VICRYL PLUS FS-2 - 27 INCH (36/BX)

## (undated) DEVICE — COVER LIGHT HANDLE ALC PLUS DISP (18EA/BX)

## (undated) DEVICE — RELOAD WITH GRIPPING SURFACE TECHNOLOGY GOLD 60MM (12EA/BX)

## (undated) DEVICE — DRESSING TRANSPARENT FILM TEGADERM 2.375 X 2.75"  (100EA/BX)"

## (undated) DEVICE — TUBING CLEARLINK DUO-VENT - C-FLO (48EA/CA)

## (undated) DEVICE — SOD. CHL. INJ. 0.9% 1000 ML - (14EA/CA 60CA/PF)

## (undated) DEVICE — SUTURE 0 SILK CT-1 (36PK/BX)

## (undated) DEVICE — LACTATED RINGERS INJ 1000 ML - (14EA/CA 60CA/PF)

## (undated) DEVICE — CANISTER SUCTION 3000ML MECHANICAL FILTER AUTO SHUTOFF MEDI-VAC NONSTERILE LF DISP  (40EA/CA)

## (undated) DEVICE — SET TUBING PNEUMOCLEAR HIGH FLOW SMOKE EVACUATION (10EA/BX)

## (undated) DEVICE — GOWN WARMING STANDARD FLEX - (30/CA)

## (undated) DEVICE — SET EXTENSION WITH 2 PORTS (48EA/CA) ***PART #2C8610 IS A SUBSTITUTE*****

## (undated) DEVICE — SPONGE DRAIN 4 X 4IN 6-PLY - (2/PK25PK/BX12BX/CS)

## (undated) DEVICE — SYSTEM CHEST DRAIN ADULT/PEDS W/AUTO TRANSFUSION CAPABILITY SAHARA (6EA/CA)

## (undated) DEVICE — SPONGE GAUZESTER. 2X2 4-PL - (2/PK 50PK/BX 30BX/CS)

## (undated) DEVICE — TOWELS CLOTH SURGICAL - (4/PK 20PK/CA)

## (undated) DEVICE — SLEEVE, VASO, THIGH, MED

## (undated) DEVICE — GLOVE BIOGEL M SZ 8 SURGICAL PF LTX - (50/BX 4BX/CA)

## (undated) DEVICE — STAPLE 60MM WHTE 2.6MM - (12/BX) WAS PART #ECR60W

## (undated) DEVICE — TUBING LAPAROSCOPIC PLUME DEVICE (10EA/CA)

## (undated) DEVICE — STAPLER CIRCULAR POWERED 25MM ECHELON (3EA/BX)

## (undated) DEVICE — STAPLER ECHELON 3000 60MM STANDARD (3EA/BX)

## (undated) DEVICE — CLIP MED LG INTNL HRZN TI ESCP - (20/BX)

## (undated) DEVICE — ENDOSTITCH LOAD UNIT 0 SURGI - 12/CA

## (undated) DEVICE — TROCAR 5X100 NON BLADED Z-TH - READ KII (6/BX)

## (undated) DEVICE — TRAY CATHETER FOLEY URINE METER W/STATLOCK 350ML (10EA/CA)

## (undated) DEVICE — TROCAR Z THREAD12MM OPTICAL - NON BLADED (6/BX)

## (undated) DEVICE — STAPLER 60MM ARTICULATING (3EA/BX)

## (undated) DEVICE — SODIUM CHL IRRIGATION 0.9% 1000ML (12EA/CA)

## (undated) DEVICE — PACK LAP CHOLE OR - (2EA/CA)

## (undated) DEVICE — CONNECTOR Y TBG CRTY 5 IN 1 STERILE (50EA/CA)

## (undated) DEVICE — ELECTRODE DUAL RETURN W/ CORD - (50/PK)

## (undated) DEVICE — SET LEADWIRE 5 LEAD BEDSIDE DISPOSABLE ECG (1SET OF 5/EA)

## (undated) DEVICE — CHLORAPREP 26 ML APPLICATOR - ORANGE TINT(25/CA)

## (undated) DEVICE — CANNULA W/SEAL12X100ZTHREAD - (12/BX)

## (undated) DEVICE — NEEDLE INSFL 120MM 14GA VRRS - (20/BX)

## (undated) DEVICE — SLEEVE VASO CALF MED - (10PR/CA)

## (undated) DEVICE — DRAIN J-VAC 19FR. ROUND - (10/CS)

## (undated) DEVICE — SUTURE 3-0 ETHILON FS-1 - (36/BX) 30 INCH

## (undated) DEVICE — SENSOR OXIMETER ADULT SPO2 RD SET (20EA/BX)

## (undated) DEVICE — SUTURE 0 VICRYL PLUS CT-2 - 27 INCH (36/BX)

## (undated) DEVICE — DRAPE CHEST/BREAST - (12EA/CA)

## (undated) DEVICE — SUTURE GENERAL

## (undated) DEVICE — CLIP MED INTNL HRZN TI ESCP - (25/BX)